# Patient Record
Sex: FEMALE | Race: WHITE | NOT HISPANIC OR LATINO | Employment: UNEMPLOYED | URBAN - METROPOLITAN AREA
[De-identification: names, ages, dates, MRNs, and addresses within clinical notes are randomized per-mention and may not be internally consistent; named-entity substitution may affect disease eponyms.]

---

## 2018-01-10 ENCOUNTER — OFFICE VISIT (OUTPATIENT)
Dept: FAMILY MEDICINE | Facility: CLINIC | Age: 43
End: 2018-01-10
Payer: COMMERCIAL

## 2018-01-10 ENCOUNTER — RADIANT APPOINTMENT (OUTPATIENT)
Dept: GENERAL RADIOLOGY | Facility: CLINIC | Age: 43
End: 2018-01-10
Attending: FAMILY MEDICINE
Payer: COMMERCIAL

## 2018-01-10 VITALS
WEIGHT: 153 LBS | SYSTOLIC BLOOD PRESSURE: 136 MMHG | OXYGEN SATURATION: 100 % | TEMPERATURE: 98.4 F | HEIGHT: 63 IN | DIASTOLIC BLOOD PRESSURE: 82 MMHG | BODY MASS INDEX: 27.11 KG/M2 | HEART RATE: 89 BPM | RESPIRATION RATE: 14 BRPM

## 2018-01-10 DIAGNOSIS — M75.41 IMPINGEMENT SYNDROME OF SHOULDER REGION, RIGHT: ICD-10-CM

## 2018-01-10 DIAGNOSIS — G56.01 CARPAL TUNNEL SYNDROME OF RIGHT WRIST: ICD-10-CM

## 2018-01-10 DIAGNOSIS — M75.41 IMPINGEMENT SYNDROME OF SHOULDER REGION, RIGHT: Primary | ICD-10-CM

## 2018-01-10 PROCEDURE — 73030 X-RAY EXAM OF SHOULDER: CPT | Mod: RT

## 2018-01-10 PROCEDURE — 99213 OFFICE O/P EST LOW 20 MIN: CPT | Performed by: FAMILY MEDICINE

## 2018-01-10 NOTE — NURSING NOTE
"Chief Complaint   Patient presents with     Shoulder Pain     Right shoulder and arm pain X 5 months        Initial /82 (BP Location: Right arm, Patient Position: Chair, Cuff Size: Adult Regular)  Pulse 89  Temp 98.4  F (36.9  C) (Oral)  Resp 14  Ht 5' 3\" (1.6 m)  Wt 153 lb (69.4 kg)  SpO2 100%  BMI 27.1 kg/m2 Estimated body mass index is 27.1 kg/(m^2) as calculated from the following:    Height as of this encounter: 5' 3\" (1.6 m).    Weight as of this encounter: 153 lb (69.4 kg).  Medication Reconciliation: complete   Price Barreto MA      "

## 2018-01-10 NOTE — MR AVS SNAPSHOT
After Visit Summary   1/10/2018    Akila Rivers    MRN: [id number]           Patient Information     Date Of Birth          1975        Visit Information        Provider Department      1/10/2018 3:00 PM Genesis Park MD [facility]        Today's Diagnoses     Impingement syndrome of shoulder region, right    -  1    Carpal tunnel syndrome of right wrist           Follow-ups after your visit        Additional Services     NEUROLOGY ADULT REFERRAL       Your provider has referred you for the following:   EMG at [facility]: [facility] [phone]   [email or web address]    Please be aware that coverage of these services is subject to the terms and limitations of your health insurance plan.  Call member services at your health plan with any benefit or coverage questions.      Please bring the following with you to your appointment:    (1) Any X-Rays, CTs or MRIs which have been performed.  Contact the facility where they were done to arrange for  prior to your scheduled appointment.    (2) List of current medications  (3) This referral request   (4) Any documents/labs given to you for this referral                  Who to contact     If you have questions or need follow up information about today's clinic visit or your schedule please contact [facility] directly at [phone].  Normal or non-critical lab and imaging results will be communicated to you by MyChart, letter or phone within 4 business days after the clinic has received the results. If you do not hear from us within 7 days, please contact the clinic through MyChart or phone. If you have a critical or abnormal lab result, we will notify you by phone as soon as possible.  Submit refill requests through [employer] or call your pharmacy and they will forward the refill request to us. Please allow 3 business days for  "your refill to be completed.          Additional Information About Your Visit        Disqushart Information     [employer] lets you send messages to your doctor, view your test results, renew your prescriptions, schedule appointments and more. To sign up, go to [email or web address] . Click on \"Log in\" on the left side of the screen, which will take you to the Welcome page. Then click on \"Sign up Now\" on the right side of the page.     You will be asked to enter the access code listed below, as well as some personal information. Please follow the directions to create your username and password.     Your access code is: [email or web address]  Expires: 4/10/2018  3:28 PM     Your access code will  in 90 days. If you need help or a new code, please call your [facility] clinic or [phone].        Care EveryWhere ID     This is your Care EveryWhere ID. This could be used by other organizations to access your [facility] medical records  [id number]        Your Vitals Were     Pulse Temperature Respirations Height Pulse Oximetry BMI (Body Mass Index)    89 98.4  F (36.9  C) (Oral) 14 5' 3\" (1.6 m) 100% 27.1 kg/m2       Blood Pressure from Last 3 Encounters:   01/10/18 136/82   16 136/80   16 118/72    Weight from Last 3 Encounters:   01/10/18 153 lb (69.4 kg)   16 149 lb (67.6 kg)   16 149 lb 14.4 oz (68 kg)              We Performed the Following     NEUROLOGY ADULT REFERRAL        Primary Care Provider Office Phone # Fax #    Genesis Park [phone] [address] [facility] [address]        Equal Access to Services     [facility] KENRICK : Kylee Jiang, karlee valentin, landy butleralmaanatoly stapleton, rashid cruz. So [facility] [phone].    ATENCIÓN: Si habla español, tiene a pisano disposición servicios gratuitos de asistencia lingüística. Llame al [phone].    We comply with applicable federal civil rights laws and [place] laws. We do " not discriminate on the basis of race, color, national origin, age, disability, sex, sexual orientation, or gender identity.            Thank you!     Thank you for choosing [facility]  for your care. Our goal is always to provide you with excellent care. Hearing back from our patients is one way we can continue to improve our services. Please take a few minutes to complete the written survey that you may receive in the mail after your visit with us. Thank you!             Your Updated Medication List - Protect others around you: Learn how to safely use, store and throw away your medicines at www.disposemymeds.org.          This list is accurate as of: 1/10/18  3:28 PM.  Always use your most recent med list.                   Brand Name Dispense Instructions for use Diagnosis    BENADRYL PO           cetirizine 10 MG tablet    zyrTEC    30 tablet    Take 1 tablet (10 mg) by mouth every evening    Seasonal allergic rhinitis       clindamycin 150 MG capsule    CLEOCIN    40 capsule    Take 1 capsule (150 mg) by mouth 4 times daily    Finger infection       hydrocortisone 0.2 % cream    WESTCORT    60 g    Apply sparingly to affected area three times daily as needed.    Other eczema       ibuprofen 200 MG tablet    ADVIL/MOTRIN     Take 200 mg by mouth every 8 hours as needed for mild pain        order for DME     1 each    Equipment being ordered: shampoo and conditioner  FREE AND CLEAR SHAMPOO AND CONDITIONER DUE TO ALLERGIC REACTION    Allergic reaction, initial encounter       ranitidine 150 MG tablet    ZANTAC    60 tablet    Take 1 tablet (150 mg) by mouth 2 times daily as needed    Gastroesophageal reflux disease, esophagitis presence not specified       TUMS 500 OR           valACYclovir 1000 mg tablet    VALTREX    4 tablet    Take 2 tablets (2,000 mg) by mouth 2 times daily    Recurrent cold sores

## 2018-01-10 NOTE — PROGRESS NOTES
SUBJECTIVE:   Akila Rivers is a 42 year old female who presents to clinic today for the following health issues:    She describes right shoulder and arm pain that began last 8/2017 after vacation.  She played a lot of volleyball.   She says pain starts at shoulder and travels down her hand.   She wakes in am and her hand is stiff and numb.  It is not numb all day.   She has weakness of her right hand and she does not trust her strength.  She will use her left arm and hand to help her right.         Joint Pain    Onset: X 5 months    Description:   Location: right shoulder  Character: Sharp    Intensity: mild during the day, severe at night    Progression of Symptoms: worse    Accompanying Signs & Symptoms:  Other symptoms: radiation of pain to Right arm    History:   Previous similar pain: no       Precipitating factors:   Trauma or overuse: no     Alleviating factors:  Improved by: heat, massage and Ibuprofen    Therapies Tried and outcome: message, oil, IBU helps        Past Medical History:   Diagnosis Date     Blood type O+      Headache(784.0)     when working with chemicals       History reviewed. No pertinent surgical history.    MEDICATIONS:  Current Outpatient Prescriptions   Medication     ibuprofen (ADVIL,MOTRIN) 200 MG tablet     valACYclovir (VALTREX) 1000 mg tablet     ranitidine (ZANTAC) 150 MG tablet     hydrocortisone (WESTCORT) 0.2 % cream     clindamycin (CLEOCIN) 150 MG capsule     Calcium Carbonate (TUMS 500 OR)     DiphenhydrAMINE HCl (BENADRYL PO)     cetirizine (ZYRTEC) 10 MG tablet     order for DME     No current facility-administered medications for this visit.        SOCIAL HISTORY:  Social History   Substance Use Topics     Smoking status: Never Smoker     Smokeless tobacco: Never Used     Alcohol use No       Family History   Problem Relation Age of Onset     CANCER Mother      cervical     Thyroid Disease Mother      took medication for this     Family History Negative Father       "OSTEOPOROSIS Paternal Uncle      60       Objective:  Blood pressure 136/82, pulse 89, temperature 98.4  F (36.9  C), temperature source Oral, resp. rate 14, height 5' 3\" (1.6 m), weight 153 lb (69.4 kg), SpO2 100 %, not currently breastfeeding.  Chest: Clear to auscultation bilaterally.  No wheezes, rales or retractions.  CV: Regular rate and rhythm without murmurs, rubs or gallops.  Right Shoulder exam:  There is no swelling, redness or gross deformity of the shoulder joints.  There is full range of motion with some pain in the midarch.  The empty can sign is NEG.  The impingement sign is NEG.  The rotater cuff strength is 5/5 with no pain with internal rotation and none with external rotation.  She has some pain in the insertion of the biceps tendon  Right hand: tinnels sign is NEG but her right thenar eminence is smaller than the left      Xray of the right shoulder: normal      Assessment:  1. ? Tendonitis of shoulder vs impingement   2. Suspect carpal tunnel syndrome      Plan:  1. Will get EMG  2. If this is normal, will send to PT  3. The patient is to follow up as needed for nonresolution of symptoms              "

## 2018-01-19 ENCOUNTER — TRANSFERRED RECORDS (OUTPATIENT)
Dept: HEALTH INFORMATION MANAGEMENT | Facility: CLINIC | Age: 43
End: 2018-01-19

## 2019-11-07 ENCOUNTER — OFFICE VISIT (OUTPATIENT)
Dept: FAMILY MEDICINE | Facility: CLINIC | Age: 44
End: 2019-11-07

## 2019-11-07 VITALS
OXYGEN SATURATION: 94 % | DIASTOLIC BLOOD PRESSURE: 64 MMHG | HEART RATE: 111 BPM | TEMPERATURE: 100.2 F | WEIGHT: 151 LBS | SYSTOLIC BLOOD PRESSURE: 110 MMHG | BODY MASS INDEX: 26.75 KG/M2 | HEIGHT: 63 IN

## 2019-11-07 DIAGNOSIS — J98.01 ACUTE BRONCHOSPASM: ICD-10-CM

## 2019-11-07 DIAGNOSIS — J06.9 VIRAL UPPER RESPIRATORY INFECTION: ICD-10-CM

## 2019-11-07 DIAGNOSIS — R50.9 FEVER, UNSPECIFIED FEVER CAUSE: Primary | ICD-10-CM

## 2019-11-07 LAB
FLUAV+FLUBV AG SPEC QL: NEGATIVE
FLUAV+FLUBV AG SPEC QL: NEGATIVE
SPECIMEN SOURCE: NORMAL

## 2019-11-07 PROCEDURE — 87804 INFLUENZA ASSAY W/OPTIC: CPT | Performed by: NURSE PRACTITIONER

## 2019-11-07 PROCEDURE — 99213 OFFICE O/P EST LOW 20 MIN: CPT | Performed by: NURSE PRACTITIONER

## 2019-11-07 RX ORDER — BENZONATATE 200 MG/1
200 CAPSULE ORAL 3 TIMES DAILY PRN
Qty: 30 CAPSULE | Refills: 0 | Status: SHIPPED | OUTPATIENT
Start: 2019-11-07 | End: 2021-08-23

## 2019-11-07 RX ORDER — ALBUTEROL SULFATE 90 UG/1
2 AEROSOL, METERED RESPIRATORY (INHALATION) EVERY 4 HOURS PRN
Qty: 1 INHALER | Refills: 0 | Status: SHIPPED | OUTPATIENT
Start: 2019-11-07 | End: 2024-04-17

## 2019-11-07 ASSESSMENT — MIFFLIN-ST. JEOR: SCORE: 1309.06

## 2019-11-07 NOTE — PROGRESS NOTES
Subjective     Akila Rivers is a 44 year old female who presents to clinic today for the following health issues:    HPI   Acute Illness   Acute illness concerns: fever  Onset: 5 days      Fever: YES- 101, has improved over the past 1 day.      Chills/Sweats: YES    Headache (location?): YES    Sinus Pressure:no    Conjunctivitis:  no    Ear Pain: YES    Rhinorrhea: no    Congestion: YES    Sore Throat: no     Cough: YES - Difficulty with sleeping at night.      No shortness of breath.      Wheeze: YES    Decreased Appetite: YES    Nausea: YES    Vomiting: no    Diarrhea:  no    Dysuria/Freq.: no    Fatigue/Achiness: YES    Sick/Strep Exposure: YES- family     Therapies Tried and outcome: ibuprofen, herbal tea, cough drops.        Patient Active Problem List   Diagnosis     Esophageal reflux     Anemia     CARDIOVASCULAR SCREENING; LDL GOAL LESS THAN 160     Plantar fasciitis     IBS (irritable bowel syndrome)     Seasonal allergic rhinitis     Back pain     Family history of osteoporosis     No past surgical history on file.    Social History     Tobacco Use     Smoking status: Never Smoker     Smokeless tobacco: Never Used   Substance Use Topics     Alcohol use: No     Family History   Problem Relation Age of Onset     Cancer Mother         cervical     Thyroid Disease Mother         took medication for this     Family History Negative Father      Osteoporosis Paternal Uncle         60         Current Outpatient Medications   Medication Sig Dispense Refill     albuterol (PROAIR HFA/PROVENTIL HFA/VENTOLIN HFA) 108 (90 Base) MCG/ACT inhaler Inhale 2 puffs into the lungs every 4 hours as needed for shortness of breath / dyspnea or wheezing 1 Inhaler 0     benzonatate (TESSALON) 200 MG capsule Take 1 capsule (200 mg) by mouth 3 times daily as needed for cough 30 capsule 0     Calcium Carbonate (TUMS 500 OR)        cetirizine (ZYRTEC) 10 MG tablet Take 1 tablet (10 mg) by mouth every evening (Patient not taking:  "Reported on 1/10/2018) 30 tablet 6     DiphenhydrAMINE HCl (BENADRYL PO)        ibuprofen (ADVIL,MOTRIN) 200 MG tablet Take 200 mg by mouth every 8 hours as needed for mild pain       ranitidine (ZANTAC) 150 MG tablet Take 1 tablet (150 mg) by mouth 2 times daily as needed (Patient not taking: Reported on 1/10/2018) 60 tablet 6     Allergies   Allergen Reactions     Robitussin Night Time      Robitussin AC - had lip swelling and tingling     Banana Swelling     Cefzil [Cefprozil]      hives--itching--rash     Flexeril [Cyclobenzaprine Hcl] Swelling and Rash     Iron Rash     rash and itchy     Penicillins Itching and Swelling     Prenatal Vitamin GI Disturbance       Reviewed and updated as needed this visit by Provider         Review of Systems   ROS COMP: Constitutional, HEENT, cardiovascular, pulmonary, gi and gu systems are negative, except as otherwise noted.      Objective    /64   Pulse 111   Temp 100.2  F (37.9  C) (Tympanic)   Ht 1.6 m (5' 3\")   Wt 68.5 kg (151 lb)   SpO2 94%   BMI 26.75 kg/m    Body mass index is 26.75 kg/m .  Physical Exam   GENERAL: healthy, alert and no distress  EYES: Eyes grossly normal to inspection, PERRL and conjunctivae and sclerae normal  HENT: ear canals and TM's normal, nose and mouth without ulcers or lesions  NECK: no adenopathy, no asymmetry, masses  RESP: audible harsh cough, posterior lung sounds with good air flow and mild expiratory wheezes throughout all lung fields.    CV: regular rate and rhythm, normal S1 S2, no S3 or S4, no murmur, click or rub, no peripheral edema   PSYCH: mentation appears normal, affect normal/bright            Assessment & Plan     Akila was seen today for fever.    Diagnoses and all orders for this visit:          Fever, unspecified fever cause  -     Influenza A/B antigen  Results for orders placed or performed in visit on 11/07/19   Influenza A/B antigen     Status: None   Result Value Ref Range    Influenza A/B Agn Specimen Nasal  "    Influenza A Negative NEG^Negative    Influenza B Negative NEG^Negative       Viral upper respiratory infection        Acute bronchospasm  Patient education completed regarding viral cause, typical course, symptomatic treatment and when to follow-up.   Recommended use of Albuterol inhaler every 4-6 hours scheduled until cough improves.    -     albuterol (PROAIR HFA/PROVENTIL HFA/VENTOLIN HFA) 108 (90 Base) MCG/ACT inhaler; Inhale 2 puffs into the lungs every 4 hours as needed for shortness of breath / dyspnea or wheezing  -     benzonatate (TESSALON) 200 MG capsule; Take 1 capsule (200 mg) by mouth 3 times daily as needed for cough      Return in about 1 week (around 11/14/2019) for No improvement or sooner with worsening symptoms.      ROBERT Brannon [facility]

## 2019-11-07 NOTE — LETTER
November 14, 2019      Akila Rivers  [address] [place] [place] [address]        To Whom It May Concern:    Akila Rivers was seen in our clinic on 11/7/19 for an acute illness. She may return to work once her symptoms have improved.        Sincerely,        ROBERT Brannon CNP

## 2019-11-07 NOTE — PATIENT INSTRUCTIONS
Akila was seen today for fever.    Diagnoses and all orders for this visit:    Fever, unspecified fever cause  -     Influenza A/B antigen  Results for orders placed or performed in visit on 11/07/19   Influenza A/B antigen     Status: None   Result Value Ref Range    Influenza A/B Agn Specimen Nasal     Influenza A Negative NEG^Negative    Influenza B Negative NEG^Negative       Acute bronchospasm  -     albuterol (PROAIR HFA/PROVENTIL HFA/VENTOLIN HFA) 108 (90 Base) MCG/ACT inhaler; Inhale 2 puffs into the lungs every 4 hours as needed for shortness of breath / dyspnea or wheezing    Viral upper respiratory infection  -     benzonatate (TESSALON) 200 MG capsule; Take 1 capsule (200 mg) by mouth 3 times daily as needed for cough

## 2021-08-23 ENCOUNTER — OFFICE VISIT (OUTPATIENT)
Dept: FAMILY MEDICINE | Facility: CLINIC | Age: 46
End: 2021-08-23
Payer: COMMERCIAL

## 2021-08-23 VITALS
SYSTOLIC BLOOD PRESSURE: 134 MMHG | WEIGHT: 148.8 LBS | TEMPERATURE: 98.3 F | OXYGEN SATURATION: 100 % | HEART RATE: 75 BPM | HEIGHT: 64 IN | DIASTOLIC BLOOD PRESSURE: 84 MMHG | BODY MASS INDEX: 25.4 KG/M2 | RESPIRATION RATE: 16 BRPM

## 2021-08-23 DIAGNOSIS — Z13.6 CARDIOVASCULAR SCREENING; LDL GOAL LESS THAN 160: Primary | ICD-10-CM

## 2021-08-23 DIAGNOSIS — Z12.31 VISIT FOR SCREENING MAMMOGRAM: ICD-10-CM

## 2021-08-23 DIAGNOSIS — Z00.00 ROUTINE GENERAL MEDICAL EXAMINATION AT A HEALTH CARE FACILITY: ICD-10-CM

## 2021-08-23 LAB
ERYTHROCYTE [DISTWIDTH] IN BLOOD BY AUTOMATED COUNT: 13.2 % (ref 10–15)
HCT VFR BLD AUTO: 40.1 % (ref 35–47)
HCV AB SERPL QL IA: NONREACTIVE
HGB BLD-MCNC: 13.1 G/DL (ref 11.7–15.7)
MCH RBC QN AUTO: 30 PG (ref 26.5–33)
MCHC RBC AUTO-ENTMCNC: 32.7 G/DL (ref 31.5–36.5)
MCV RBC AUTO: 92 FL (ref 78–100)
PLATELET # BLD AUTO: 289 10E3/UL (ref 150–450)
RBC # BLD AUTO: 4.36 10E6/UL (ref 3.8–5.2)
WBC # BLD AUTO: 4.7 10E3/UL (ref 4–11)

## 2021-08-23 PROCEDURE — 84443 ASSAY THYROID STIM HORMONE: CPT | Performed by: FAMILY MEDICINE

## 2021-08-23 PROCEDURE — 99396 PREV VISIT EST AGE 40-64: CPT | Performed by: FAMILY MEDICINE

## 2021-08-23 PROCEDURE — 85027 COMPLETE CBC AUTOMATED: CPT | Performed by: FAMILY MEDICINE

## 2021-08-23 PROCEDURE — 86803 HEPATITIS C AB TEST: CPT | Performed by: FAMILY MEDICINE

## 2021-08-23 PROCEDURE — 80061 LIPID PANEL: CPT | Performed by: FAMILY MEDICINE

## 2021-08-23 PROCEDURE — 36415 COLL VENOUS BLD VENIPUNCTURE: CPT | Performed by: FAMILY MEDICINE

## 2021-08-23 PROCEDURE — 80053 COMPREHEN METABOLIC PANEL: CPT | Performed by: FAMILY MEDICINE

## 2021-08-23 ASSESSMENT — ENCOUNTER SYMPTOMS
DYSURIA: 0
FREQUENCY: 0
DIZZINESS: 0
HEMATOCHEZIA: 0
NAUSEA: 0
HEMATURIA: 0
COUGH: 0
SHORTNESS OF BREATH: 0
DIARRHEA: 0
CONSTIPATION: 0
FEVER: 0
EYE PAIN: 0
PALPITATIONS: 0
JOINT SWELLING: 0
ARTHRALGIAS: 0
HEARTBURN: 0
SORE THROAT: 0
WEAKNESS: 0
MYALGIAS: 0
ABDOMINAL PAIN: 0
NERVOUS/ANXIOUS: 0
HEADACHES: 1
BREAST MASS: 0
PARESTHESIAS: 0
CHILLS: 0

## 2021-08-23 ASSESSMENT — MIFFLIN-ST. JEOR: SCORE: 1297.01

## 2021-08-23 NOTE — PATIENT INSTRUCTIONS
Preventive Health Recommendations  Female Ages 40 to 49    Yearly exam:     See your health care provider every year in order to  1. Review health changes.   2. Discuss preventive care.    3. Review your medicines if your doctor prescribed any.      Get a Pap test every three years (unless you have an abnormal result and your provider advises testing more often).      If you get Pap tests with HPV test, you only need to test every 5 years, unless you have an abnormal result. You do not need a Pap test if your uterus was removed (hysterectomy) and you have not had cancer.      You should be tested each year for STDs (sexually transmitted diseases), if you're at risk.     Ask your doctor if you should have a mammogram.      Have a colonoscopy (test for colon cancer) if someone in your family has had colon cancer or polyps before age 50.       Have a cholesterol test every 5 years.       Have a diabetes test (fasting glucose) after age 45. If you are at risk for diabetes, you should have this test every 3 years.    Shots: Get a flu shot each year. Get a tetanus shot every 10 years.     Nutrition:     Eat at least 5 servings of fruits and vegetables each day.    Eat whole-grain bread, whole-wheat pasta and brown rice instead of white grains and rice.    Get adequate Calcium and Vitamin D.      Lifestyle    Exercise at least 150 minutes a week (an average of 30 minutes a day, 5 days a week). This will help you control your weight and prevent disease.    Limit alcohol to one drink per day.    No smoking.     Wear sunscreen to prevent skin cancer.    See your dentist every six months for an exam and cleaning.      You may schedule your mammogram at [facility] by calling [phone] and asking to schedule your mammogram or you may schedule with [facility] in [place] by calling [phone].

## 2021-08-23 NOTE — PROGRESS NOTES
SUBJECTIVE:   CC: Akila Rivers is an 46 year old woman who presents for preventive health visit.     She has no concerns today.   She wants to make sure the ultrasound she had back in 2013 was negative for cancer under her chin.  She does not take any regular medications.   She is getting her period right now and will come back to have her pap.         Patient has been advised of split billing requirements and indicates understanding: Yes  Healthy Habits:     Getting at least 3 servings of Calcium per day:  Yes    Bi-annual eye exam:  Yes    Dental care twice a year:  Yes    Sleep apnea or symptoms of sleep apnea:  None    Diet:  Regular (no restrictions)    Frequency of exercise:  1 day/week    Duration of exercise:  15-30 minutes    Taking medications regularly:  Yes    Medication side effects:  None    PHQ-2 Total Score: 1    Additional concerns today:  No        Today's PHQ-2 Score:   PHQ-2 ( 1999 [employer]) 8/23/2021   Q1: Little interest or pleasure in doing things 0   Q2: Feeling down, depressed or hopeless 1   PHQ-2 Score 1   Q1: Little interest or pleasure in doing things Not at all   Q2: Feeling down, depressed or hopeless Several days   PHQ-2 Score 1       Abuse: Current or Past (Physical, Sexual or Emotional) - No  Do you feel safe in your environment? Yes    Have you ever done Advance Care Planning? (For example, a Health Directive, POLST, or a discussion with a medical provider or your loved ones about your wishes): did not discuss today     Social History     Tobacco Use     Smoking status: Never Smoker     Smokeless tobacco: Never Used   Substance Use Topics     Alcohol use: No     If you drink alcohol do you typically have >3 drinks per day or >7 drinks per week? No    Alcohol Use 8/23/2021   Prescreen: >3 drinks/day or >7 drinks/week? No   Prescreen: >3 drinks/day or >7 drinks/week? -       Reviewed orders with patient.  Reviewed health maintenance and updated orders accordingly - Yes  Labs  reviewed in EPIC    Breast Cancer Screening:  Any new diagnosis of family breast, ovarian, or bowel cancer? Yes - mother with cervical cancer      FHS-7:   Breast CA Risk Assessment (FHS-7) 8/23/2021   Did any of your first-degree relatives have breast or ovarian cancer? Yes   Did any of your relatives have bilateral breast cancer? No   Did any man in your family have breast cancer? No   Did any woman in your family have breast and ovarian cancer? No   Did any woman in your family have breast cancer before age 50 y? Yes   Do you have 2 or more relatives with breast and/or bowel cancer? No       Mammogram Screening: Recommended annual mammography  Pertinent mammograms are reviewed under the imaging tab.    History of abnormal Pap smear: NO - age 30-65 PAP every 5 years with negative HPV co-testing recommended  PAP / HPV 8/24/2016 2/20/2013 1/25/2012   PAP (Historical) NIL NIL NIL     Reviewed and updated as needed this visit by clinical staff                 Reviewed and updated as needed this visit by Provider                Past Medical History:   Diagnosis Date     Blood type O+      Headache(784.0)     when working with chemicals       History reviewed. No pertinent surgical history.    MEDICATIONS:  Current Outpatient Medications   Medication     albuterol (PROAIR HFA/PROVENTIL HFA/VENTOLIN HFA) 108 (90 Base) MCG/ACT inhaler     Calcium Carbonate (TUMS 500 OR)     DiphenhydrAMINE HCl (BENADRYL PO)     ibuprofen (ADVIL,MOTRIN) 200 MG tablet     No current facility-administered medications for this visit.       SOCIAL HISTORY:  Social History     Tobacco Use     Smoking status: Never Smoker     Smokeless tobacco: Never Used   Substance Use Topics     Alcohol use: No       Family History   Problem Relation Age of Onset     Cancer Mother         cervical     Thyroid Disease Mother         took medication for this     Family History Negative Father      Osteoporosis Paternal Uncle         60         Review of Systems  "  Constitutional: Negative for chills and fever.   HENT: Negative for congestion, ear pain, hearing loss and sore throat.    Eyes: Positive for visual disturbance. Negative for pain.   Respiratory: Negative for cough and shortness of breath.    Cardiovascular: Negative for chest pain, palpitations and peripheral edema.   Gastrointestinal: Negative for abdominal pain, constipation, diarrhea, heartburn, hematochezia and nausea.   Breasts:  Negative for tenderness, breast mass and discharge.   Genitourinary: Negative for dysuria, frequency, genital sores, hematuria, pelvic pain, urgency, vaginal bleeding and vaginal discharge.   Musculoskeletal: Negative for arthralgias, joint swelling and myalgias.   Skin: Negative for rash.   Neurological: Positive for headaches. Negative for dizziness, weakness and paresthesias.   Psychiatric/Behavioral: Negative for mood changes. The patient is not nervous/anxious.           OBJECTIVE:   /84 (BP Location: Right arm, Patient Position: Chair, Cuff Size: Adult Regular)   Pulse 75   Temp 98.3  F (36.8  C) (Oral)   Resp 16   Ht 1.613 m (5' 3.5\")   Wt 67.5 kg (148 lb 12.8 oz)   LMP 08/23/2021   SpO2 100%   BMI 25.95 kg/m    Physical Exam  GENERAL: healthy, alert and no distress  EYES: Eyes grossly normal to inspection, PERRL and conjunctivae and sclerae normal  HENT: ear canals and TM's normal, nose and mouth without ulcers or lesions  NECK: no adenopathy, no asymmetry, masses, or scars and thyroid normal to palpation  RESP: lungs clear to auscultation - no rales, rhonchi or wheezes  BREAST: normal without masses, tenderness or nipple discharge and no palpable axillary masses or adenopathy  CV: regular rate and rhythm, normal S1 S2, no S3 or S4, no murmur, click or rub, no peripheral edema and peripheral pulses strong  ABDOMEN: soft, nontender, no hepatosplenomegaly, no masses and bowel sounds normal  MS: no gross musculoskeletal defects noted, no edema  SKIN: no suspicious " "lesions or rashes  NEURO: Normal strength and tone, mentation intact and speech normal  PSYCH: mentation appears normal, affect normal/bright  LYMPH: no cervical, supraclavicular, axillary, or inguinal adenopathy    Diagnostic Test Results:  Labs reviewed in Epic    ASSESSMENT/PLAN:   (Z13.6) CARDIOVASCULAR SCREENING; LDL GOAL LESS THAN 160  (primary encounter diagnosis)  Comment:   Plan: Lipid panel reflex to direct LDL Fasting            (Z00.00) Routine general medical examination at a health care facility  Comment:   Plan: Hepatitis C antibody, Lipid panel reflex to         direct LDL Fasting, CBC with platelets,         Comprehensive metabolic panel (BMP + Alb, Alk         Phos, ALT, AST, Total. Bili, TP), TSH with free        T4 reflex    She will return for pap in 1-2 weeks            (Z12.31) Visit for screening mammogram  Comment:   Plan: Mammo Screening digital (bilat)              Patient has been advised of split billing requirements and indicates understanding: Yes  COUNSELING:  Reviewed preventive health counseling, as reflected in patient instructions  Special attention given to:        Regular exercise       Healthy diet/nutrition       Immunizations    Discussed covid vaccine and recommended this          Estimated body mass index is 26.75 kg/m  as calculated from the following:    Height as of 11/7/19: 1.6 m (5' 3\").    Weight as of 11/7/19: 68.5 kg (151 lb).        She reports that she has never smoked. She has never used smokeless tobacco.      Counseling Resources:  ATP IV Guidelines  Pooled Cohorts Equation Calculator  Breast Cancer Risk Calculator  BRCA-Related Cancer Risk Assessment: FHS-7 Tool  FRAX Risk Assessment  ICSI Preventive Guidelines  Dietary Guidelines for Americans, 2010  USDA's MyPlate  ASA Prophylaxis  Lung CA Screening    Genesis Oneill MD  [facility]"

## 2021-08-24 LAB
ALBUMIN SERPL-MCNC: 4 G/DL (ref 3.4–5)
ALP SERPL-CCNC: 74 U/L (ref 40–150)
ALT SERPL W P-5'-P-CCNC: 18 U/L (ref 0–50)
ANION GAP SERPL CALCULATED.3IONS-SCNC: 6 MMOL/L (ref 3–14)
AST SERPL W P-5'-P-CCNC: 17 U/L (ref 0–45)
BILIRUB SERPL-MCNC: 0.4 MG/DL (ref 0.2–1.3)
BUN SERPL-MCNC: 13 MG/DL (ref 7–30)
CALCIUM SERPL-MCNC: 9 MG/DL (ref 8.5–10.1)
CHLORIDE BLD-SCNC: 110 MMOL/L (ref 94–109)
CHOLEST SERPL-MCNC: 179 MG/DL
CO2 SERPL-SCNC: 24 MMOL/L (ref 20–32)
CREAT SERPL-MCNC: 0.74 MG/DL (ref 0.52–1.04)
FASTING STATUS PATIENT QL REPORTED: YES
GFR SERPL CREATININE-BSD FRML MDRD: >90 ML/MIN/1.73M2
GLUCOSE BLD-MCNC: 89 MG/DL (ref 70–99)
HDLC SERPL-MCNC: 49 MG/DL
LDLC SERPL CALC-MCNC: 107 MG/DL
NONHDLC SERPL-MCNC: 130 MG/DL
POTASSIUM BLD-SCNC: 4.2 MMOL/L (ref 3.4–5.3)
PROT SERPL-MCNC: 7.3 G/DL (ref 6.8–8.8)
SODIUM SERPL-SCNC: 140 MMOL/L (ref 133–144)
TRIGL SERPL-MCNC: 114 MG/DL
TSH SERPL DL<=0.005 MIU/L-ACNC: 0.68 MU/L (ref 0.4–4)

## 2021-09-07 ENCOUNTER — ANCILLARY PROCEDURE (OUTPATIENT)
Dept: MAMMOGRAPHY | Facility: CLINIC | Age: 46
End: 2021-09-07
Attending: FAMILY MEDICINE
Payer: COMMERCIAL

## 2021-09-07 DIAGNOSIS — Z12.31 VISIT FOR SCREENING MAMMOGRAM: ICD-10-CM

## 2021-09-07 PROCEDURE — 77067 SCR MAMMO BI INCL CAD: CPT | Mod: TC | Performed by: RADIOLOGY

## 2021-09-15 ENCOUNTER — OFFICE VISIT (OUTPATIENT)
Dept: FAMILY MEDICINE | Facility: CLINIC | Age: 46
End: 2021-09-15
Payer: COMMERCIAL

## 2021-09-15 VITALS
HEART RATE: 70 BPM | SYSTOLIC BLOOD PRESSURE: 138 MMHG | RESPIRATION RATE: 18 BRPM | OXYGEN SATURATION: 99 % | DIASTOLIC BLOOD PRESSURE: 88 MMHG | TEMPERATURE: 98.2 F | BODY MASS INDEX: 25.81 KG/M2 | WEIGHT: 148 LBS

## 2021-09-15 DIAGNOSIS — Z23 NEED FOR PROPHYLACTIC VACCINATION AND INOCULATION AGAINST INFLUENZA: Primary | ICD-10-CM

## 2021-09-15 DIAGNOSIS — Z12.4 SCREENING FOR MALIGNANT NEOPLASM OF CERVIX: ICD-10-CM

## 2021-09-15 DIAGNOSIS — K21.9 GASTROESOPHAGEAL REFLUX DISEASE WITHOUT ESOPHAGITIS: ICD-10-CM

## 2021-09-15 PROCEDURE — 99213 OFFICE O/P EST LOW 20 MIN: CPT | Mod: 25 | Performed by: FAMILY MEDICINE

## 2021-09-15 PROCEDURE — 90686 IIV4 VACC NO PRSV 0.5 ML IM: CPT | Performed by: FAMILY MEDICINE

## 2021-09-15 PROCEDURE — 87624 HPV HI-RISK TYP POOLED RSLT: CPT | Performed by: FAMILY MEDICINE

## 2021-09-15 PROCEDURE — G0145 SCR C/V CYTO,THINLAYER,RESCR: HCPCS | Performed by: FAMILY MEDICINE

## 2021-09-15 PROCEDURE — 90471 IMMUNIZATION ADMIN: CPT | Performed by: FAMILY MEDICINE

## 2021-09-15 RX ORDER — FAMOTIDINE 20 MG/1
20 TABLET, FILM COATED ORAL 2 TIMES DAILY
Qty: 30 TABLET | Refills: 11 | Status: SHIPPED | OUTPATIENT
Start: 2021-09-15 | End: 2024-04-17

## 2021-09-15 NOTE — PROGRESS NOTES
Assessment & Plan     Need for prophylactic vaccination and inoculation against influenza  Flu shot given    Screening for malignant neoplasm of cervix    - REVIEW OF HEALTH MAINTENANCE PROTOCOL ORDERS  - Pap Screen with HPV - recommended age 30 - 65 years  - INFLUENZA VACCINE IM > 6 MONTHS VALENT IIV4 (AFLURIA/FLUZONE)    Gastroesophageal reflux disease without esophagitis  Continue on current rx  - famotidine (PEPCID) 20 MG tablet; Take 1 tablet (20 mg) by mouth 2 times daily      15 minutes spent on the date of the encounter doing chart review, review of test results, patient visit and documentation            Return in about 1 year (around 9/15/2022) for Physical Exam.    Genesis Oneill MD  [facility] MARY BETH Wilburn is a 46 year old who presents for the following health issues - she had physical but she was having her period last time.     HPI     Pt is here today for pap smear.     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 No pertinent surgical history.    MEDICATIONS:  Current Outpatient Medications   Medication     famotidine (PEPCID) 20 MG tablet     albuterol (PROAIR HFA/PROVENTIL HFA/VENTOLIN HFA) 108 (90 Base) MCG/ACT inhaler     Calcium Carbonate (TUMS 500 OR)     DiphenhydrAMINE HCl (BENADRYL PO)     ibuprofen (ADVIL,MOTRIN) 200 MG tablet     No current facility-administered medications for this visit.       SOCIAL HISTORY:  Social History     Tobacco Use     Smoking status: Never Smoker     Smokeless tobacco: Never Used   Substance Use Topics     Alcohol use: No       Family History   Problem Relation Age of Onset     Cancer Mother         cervical     Thyroid Disease Mother         took medication for this     Family History Negative Father      Osteoporosis Paternal Uncle         60         Review of Systems   Constitutional, HEENT, cardiovascular, pulmonary, gi and gu  systems are negative, except as otherwise noted.      Objective    /88 (BP Location: Right arm, Patient Position: Sitting, Cuff Size: Adult Large)   Pulse 70   Temp 98.2  F (36.8  C) (Oral)   Resp 18   Wt 67.1 kg (148 lb)   LMP 08/23/2021   SpO2 99%   BMI 25.81 kg/m    Body mass index is 25.81 kg/m .  Physical Exam   GENERAL: healthy, alert and no distress  EYES: Eyes grossly normal to inspection, PERRL and conjunctivae and sclerae normal  HENT: ear canals and TM's normal, nose and mouth without ulcers or lesions  NECK: no adenopathy, no asymmetry, masses, or scars and thyroid normal to palpation  RESP: lungs clear to auscultation - no rales, rhonchi or wheezes  BREAST: normal without masses, tenderness or nipple discharge and no palpable axillary masses or adenopathy  CV: regular rate and rhythm, normal S1 S2, no S3 or S4, no murmur, click or rub, no peripheral edema and peripheral pulses strong  ABDOMEN: soft, nontender, no hepatosplenomegaly, no masses and bowel sounds normal   (female): normal female external genitalia, normal urethral meatus, vaginal mucosa pink, moist, well rugated, and normal cervix/adnexa/uterus without masses or discharge  MS: no gross musculoskeletal defects noted, no edema  SKIN: no suspicious lesions or rashes  NEURO: Normal strength and tone, mentation intact and speech normal  PSYCH: mentation appears normal, affect normal/bright  LYMPH: no cervical, supraclavicular, axillary, or inguinal adenopathy    Office Visit on 08/23/2021   Component Date Value Ref Range Status     Hepatitis C Antibody 08/23/2021 Nonreactive  Nonreactive Final     Cholesterol 08/23/2021 179  <200 mg/dL Final    Age 0-19 years  Desirable: <170 mg/dL  Borderline high:  170-199 mg/dl  High:            >199 mg/dl    Age 20 years and older  Desirable: <200 mg/dL     Triglycerides 08/23/2021 114  <150 mg/dL Final    0-9 years:  Normal:    Less than 75 mg/dL  Borderline high:  75-99 mg/dL  High:              Greater than or equal to 100 mg/dL    0-19 years:  Normal:    Less than 90 mg/dL  Borderline high:   mg/dL  High:             Greater than or equal to 130 mg/dL    20 years and older:  Normal:    Less than 150 mg/dL  Borderline high:  150-199 mg/dL  High:             200-499 mg/dL  Very high:   Greater than or equal to 500 mg/dL     Direct Measure HDL 08/23/2021 49* >=50 mg/dL Final    0-19 years:       Greater than or equal to 45 mg/dL   Low: Less than 40 mg/dL   Borderline low: 40-44 mg/dL     20 years and older:   Female: Greater than or equal to 50 mg/dL   Male:   Greater than or equal to 40 mg/dL          LDL Cholesterol Calculated 08/23/2021 107* <=100 mg/dL Final    Age 0-19 years:  Desirable: 0-110 mg/dL   Borderline high: 110-129 mg/dL   High: >= 130 mg/dL    Age 20 years and older:  Desirable: <100mg/dL  Above desirable: 100-129 mg/dL   Borderline high: 130-159 mg/dL   High: 160-189 mg/dL   Very high: >= 190 mg/dL     Non HDL Cholesterol 08/23/2021 130* <130 mg/dL Final    0-19 years:  Desirable:          Less than 120 mg/dL  Borderline high:   120-144 mg/dL  High:                   Greater than or equal to 145 mg/dL    20 years and older:  Desirable:          130 mg/dL  Above Desirable: 130-159 mg/dL  Borderline high:   160-189 mg/dL  High:               190-219 mg/dL  Very high:     Greater than or equal to 220 mg/dL     Patient Fasting > 8hrs? 08/23/2021 Yes   Final     WBC Count 08/23/2021 4.7  4.0 - 11.0 10e3/uL Final     RBC Count 08/23/2021 4.36  3.80 - 5.20 10e6/uL Final     Hemoglobin 08/23/2021 13.1  11.7 - 15.7 g/dL Final     Hematocrit 08/23/2021 40.1  35.0 - 47.0 % Final     MCV 08/23/2021 92  78 - 100 fL Final     MCH 08/23/2021 30.0  26.5 - 33.0 pg Final     MCHC 08/23/2021 32.7  31.5 - 36.5 g/dL Final     RDW 08/23/2021 13.2  10.0 - 15.0 % Final     Platelet Count 08/23/2021 289  150 - 450 10e3/uL Final     Sodium 08/23/2021 140  133 - 144 mmol/L Final     Potassium 08/23/2021 4.2   3.4 - 5.3 mmol/L Final     Chloride 08/23/2021 110* 94 - 109 mmol/L Final     Carbon Dioxide (CO2) 08/23/2021 24  20 - 32 mmol/L Final     Anion Gap 08/23/2021 6  3 - 14 mmol/L Final     Urea Nitrogen 08/23/2021 13  7 - 30 mg/dL Final     Creatinine 08/23/2021 0.74  0.52 - 1.04 mg/dL Final     Calcium 08/23/2021 9.0  8.5 - 10.1 mg/dL Final     Glucose 08/23/2021 89  70 - 99 mg/dL Final     Alkaline Phosphatase 08/23/2021 74  40 - 150 U/L Final     AST 08/23/2021 17  0 - 45 U/L Final     ALT 08/23/2021 18  0 - 50 U/L Final     Protein Total 08/23/2021 7.3  6.8 - 8.8 g/dL Final     Albumin 08/23/2021 4.0  3.4 - 5.0 g/dL Final     Bilirubin Total 08/23/2021 0.4  0.2 - 1.3 mg/dL Final     GFR Estimate 08/23/2021 >90  >60 mL/min/1.73m2 Final    As of July 11, 2021, eGFR is calculated by the CKD-EPI creatinine equation, without race adjustment. eGFR can be influenced by muscle mass, exercise, and diet. The reported eGFR is an estimation only and is only applicable if the renal function is stable.     TSH 08/23/2021 0.68  0.40 - 4.00 mU/L Final

## 2021-09-18 LAB
BKR LAB AP GYN ADEQUACY: NORMAL
BKR LAB AP GYN INTERPRETATION: NORMAL
BKR LAB AP HPV REFLEX: NORMAL
BKR LAB AP LMP: NORMAL
BKR LAB AP PREVIOUS ABNORMAL: NORMAL
PATH REPORT.COMMENTS IMP SPEC: NORMAL
PATH REPORT.RELEVANT HX SPEC: NORMAL

## 2021-09-21 LAB
HUMAN PAPILLOMA VIRUS 16 DNA: NEGATIVE
HUMAN PAPILLOMA VIRUS 18 DNA: NEGATIVE
HUMAN PAPILLOMA VIRUS FINAL DIAGNOSIS: NORMAL
HUMAN PAPILLOMA VIRUS OTHER HR: NEGATIVE

## 2022-06-28 ENCOUNTER — VIRTUAL VISIT (OUTPATIENT)
Dept: FAMILY MEDICINE | Facility: CLINIC | Age: 47
End: 2022-06-28
Payer: COMMERCIAL

## 2022-06-28 VITALS — BODY MASS INDEX: 24.46 KG/M2 | WEIGHT: 143.3 LBS | HEIGHT: 64 IN

## 2022-06-28 DIAGNOSIS — M54.50 MIDLINE LOW BACK PAIN WITHOUT SCIATICA, UNSPECIFIED CHRONICITY: ICD-10-CM

## 2022-06-28 DIAGNOSIS — Z12.11 SCREEN FOR COLON CANCER: Primary | ICD-10-CM

## 2022-06-28 PROCEDURE — 99213 OFFICE O/P EST LOW 20 MIN: CPT | Mod: 95 | Performed by: FAMILY MEDICINE

## 2022-06-28 RX ORDER — RIBOFLAVIN (VITAMIN B2) 100 MG
1 TABLET ORAL DAILY
Qty: 90 TABLET | Refills: 3 | Status: SHIPPED | OUTPATIENT
Start: 2022-06-28 | End: 2023-07-17

## 2022-06-28 RX ORDER — IBUPROFEN 200 MG
200 TABLET ORAL EVERY 8 HOURS PRN
COMMUNITY
Start: 2022-06-28

## 2022-06-28 NOTE — LETTER
"[facility]  [address] [place], [address]  [phone]        June 28, 2022    RE: Akila Rivers                                                                                                                                                       [address] [place] [address]            To Whom It May Concern,  Akila Rivers is a patient of mine.   She has struggled with on and off back pain since 2008.   She has had an MRI of her back and CT scan and a couple different xrays over the years.   She is taking glucosamine to try and help and will use ibuprofen as needed and over the counter pain patches.   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             Sincerely,    Genesis Oneill M.D.    "

## 2022-06-28 NOTE — PROGRESS NOTES
Akila is a 47 year old who is being evaluated via a billable video visit.      How would you like to obtain your AVS? MyChart  If the video visit is dropped, the invitation should be resent by: Text to cell phone: [phone]  Will anyone else be joining your video visit? No        Assessment & Plan     Screen for colon cancer  Handout on [facility] about colonoscopy  - GASTROENTEROLOGY ADULT REF PROCEDURE ONLY    Midline low back pain without sciatica, unspecified chronicity  Letter and handouts  - glucosamine-chondroitin 500-400 MG tablet  Dispense: 90 tablet; Refill: 3  - ibuprofen (ADVIL/MOTRIN) 200 MG tablet        15 minutes spent on the date of the encounter doing chart review, review of test results, interpretation of tests, patient visit and documentation        See Patient Instructions    Return in about 2 months (around 8/28/2022) for Physical Exam.    Genesis Oneill MD  [facility]    Chaparrita Wilburn is a 47 year old, presenting for the following health issues:  RECHECK (Back problems) and Forms (Work restrictions)  she has been working her current job for the last 4 years. 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   This is a problem for her.   She needs a note for work.   She used pain patch and that helped but she would like work restrictions.   She works for  [employer].       HPI     Concern - on-going back issues  Description: needs a work restriction letter      Past Medical History:   Diagnosis Date     Blood type O+      Headache(784.0)     when working with chemicals     Midline low back pain without sciatica 2008    had MRI and CT with no disc issues       History reviewed. No pertinent surgical history.    MEDICATIONS:  Current Outpatient Medications   Medication     glucosamine-chondroitin 500-400 MG tablet     ibuprofen (ADVIL/MOTRIN) 200 MG tablet     albuterol (PROAIR HFA/PROVENTIL HFA/VENTOLIN HFA) 108 (90 Base) MCG/ACT inhaler     Calcium Carbonate (TUMS 500 OR)      DiphenhydrAMINE HCl (BENADRYL PO)     famotidine (PEPCID) 20 MG tablet     No current facility-administered medications for this visit.       SOCIAL HISTORY:  Social History     Tobacco Use     Smoking status: Never Smoker     Smokeless tobacco: Never Used   Substance Use Topics     Alcohol use: No       Family History   Problem Relation Age of Onset     Cancer Mother         cervical     Thyroid Disease Mother         took medication for this     Family History Negative Father      Osteoporosis Paternal Uncle         60         Review of Systems   Constitutional, HEENT, cardiovascular, pulmonary, gi and gu systems are negative, except as otherwise noted.      Objective           Vitals:  No vitals were obtained today due to virtual visit.    Physical Exam   GENERAL: Healthy, alert and no distress  EYES: Eyes grossly normal to inspection.  No discharge or erythema, or obvious scleral/conjunctival abnormalities.  RESP: No audible wheeze, cough, or visible cyanosis.  No visible retractions or increased work of breathing.    SKIN: Visible skin clear. No significant rash, abnormal pigmentation or lesions.  NEURO: Cranial nerves grossly intact.  Mentation and speech appropriate for age.  PSYCH: Mentation appears normal, affect normal/bright, judgement and insight intact, normal speech and appearance well-groomed.    Office Visit on 09/15/2021   Component Date Value Ref Range Status     Interpretation 09/15/2021 Negative for Intraepithelial Lesion or Malignancy (NILM)    Final     Specimen Adequacy 09/15/2021 Satisfactory for evaluation, endocervical/transformation zone component absent   Final     Clinical Information 09/15/2021    Final                    Value:This result contains rich text formatting which cannot be displayed here.     LMP/Menopause Date 09/15/2021    Final                    Value:This result contains rich text formatting which cannot be displayed here.     Reflex Testing 09/15/2021 Yes  regardless of result   Final     Previous Abnormal? 09/15/2021    Final                    Value:This result contains rich text formatting which cannot be displayed here.     Performing Labs 09/15/2021    Final                    Value:This result contains rich text formatting which cannot be displayed here.     Other HR HPV 09/15/2021 Negative  Negative Final     HPV16 DNA 09/15/2021 Negative  Negative Final     HPV18 DNA 09/15/2021 Negative  Negative Final     FINAL DIAGNOSIS 09/15/2021    Final                    Value:This result contains rich text formatting which cannot be displayed here.               Video-Visit Details    Video Start Time: 10:17 AM    Type of service:  Video Visit    Video End Time:10:29 AM    Originating Location (pt. Location): Home    Distant Location (provider location):  [facility] [place] [place]     Platform used for Video Visit: [employer]    Armando Brantley

## 2022-11-20 ENCOUNTER — HEALTH MAINTENANCE LETTER (OUTPATIENT)
Age: 47
End: 2022-11-20

## 2023-06-15 ENCOUNTER — APPOINTMENT (OUTPATIENT)
Dept: MRI IMAGING | Facility: CLINIC | Age: 48
End: 2023-06-15
Attending: EMERGENCY MEDICINE
Payer: COMMERCIAL

## 2023-06-15 ENCOUNTER — HOSPITAL ENCOUNTER (EMERGENCY)
Facility: CLINIC | Age: 48
Discharge: HOME | End: 2023-06-15
Attending: EMERGENCY MEDICINE | Admitting: EMERGENCY MEDICINE
Payer: COMMERCIAL

## 2023-06-15 VITALS
BODY MASS INDEX: 28.64 KG/M2 | DIASTOLIC BLOOD PRESSURE: 74 MMHG | HEART RATE: 91 BPM | TEMPERATURE: 98.2 F | WEIGHT: 166.87 LBS | OXYGEN SATURATION: 100 % | SYSTOLIC BLOOD PRESSURE: 159 MMHG | RESPIRATION RATE: 16 BRPM

## 2023-06-15 DIAGNOSIS — M54.42 ACUTE LEFT-SIDED LOW BACK PAIN WITH LEFT-SIDED SCIATICA: ICD-10-CM

## 2023-06-15 LAB
ANION GAP SERPL CALCULATED.3IONS-SCNC: 12 MMOL/L (ref 7–15)
BASOPHILS # BLD AUTO: 0 10E3/UL (ref 0–0.2)
BASOPHILS NFR BLD AUTO: 0 %
BUN SERPL-MCNC: 16.6 MG/DL (ref 6–20)
CALCIUM SERPL-MCNC: 8.9 MG/DL (ref 8.6–10)
CHLORIDE SERPL-SCNC: 102 MMOL/L (ref 98–107)
CREAT SERPL-MCNC: 0.66 MG/DL (ref 0.51–0.95)
D DIMER PPP FEU-MCNC: <0.27 UG/ML FEU (ref 0–0.5)
DEPRECATED HCO3 PLAS-SCNC: 24 MMOL/L (ref 22–29)
EOSINOPHIL # BLD AUTO: 0.1 10E3/UL (ref 0–0.7)
EOSINOPHIL NFR BLD AUTO: 1 %
ERYTHROCYTE [DISTWIDTH] IN BLOOD BY AUTOMATED COUNT: 12.9 % (ref 10–15)
GFR SERPL CREATININE-BSD FRML MDRD: >90 ML/MIN/1.73M2
GLUCOSE SERPL-MCNC: 100 MG/DL (ref 70–99)
HCT VFR BLD AUTO: 39.5 % (ref 35–47)
HGB BLD-MCNC: 13.1 G/DL (ref 11.7–15.7)
IMM GRANULOCYTES # BLD: 0 10E3/UL
IMM GRANULOCYTES NFR BLD: 0 %
LYMPHOCYTES # BLD AUTO: 2.1 10E3/UL (ref 0.8–5.3)
LYMPHOCYTES NFR BLD AUTO: 37 %
MCH RBC QN AUTO: 29.9 PG (ref 26.5–33)
MCHC RBC AUTO-ENTMCNC: 33.2 G/DL (ref 31.5–36.5)
MCV RBC AUTO: 90 FL (ref 78–100)
MONOCYTES # BLD AUTO: 0.4 10E3/UL (ref 0–1.3)
MONOCYTES NFR BLD AUTO: 8 %
NEUTROPHILS # BLD AUTO: 3 10E3/UL (ref 1.6–8.3)
NEUTROPHILS NFR BLD AUTO: 54 %
NRBC # BLD AUTO: 0 10E3/UL
NRBC BLD AUTO-RTO: 0 /100
PLATELET # BLD AUTO: 257 10E3/UL (ref 150–450)
POTASSIUM SERPL-SCNC: 4.2 MMOL/L (ref 3.4–5.3)
RBC # BLD AUTO: 4.38 10E6/UL (ref 3.8–5.2)
SODIUM SERPL-SCNC: 138 MMOL/L (ref 136–145)
WBC # BLD AUTO: 5.6 10E3/UL (ref 4–11)

## 2023-06-15 PROCEDURE — 80048 BASIC METABOLIC PNL TOTAL CA: CPT | Performed by: EMERGENCY MEDICINE

## 2023-06-15 PROCEDURE — 36415 COLL VENOUS BLD VENIPUNCTURE: CPT | Performed by: EMERGENCY MEDICINE

## 2023-06-15 PROCEDURE — 72148 MRI LUMBAR SPINE W/O DYE: CPT

## 2023-06-15 PROCEDURE — 99284 EMERGENCY DEPT VISIT MOD MDM: CPT | Mod: 25

## 2023-06-15 PROCEDURE — 85025 COMPLETE CBC W/AUTO DIFF WBC: CPT | Performed by: EMERGENCY MEDICINE

## 2023-06-15 PROCEDURE — 85379 FIBRIN DEGRADATION QUANT: CPT | Performed by: EMERGENCY MEDICINE

## 2023-06-15 PROCEDURE — 250N000013 HC RX MED GY IP 250 OP 250 PS 637: Performed by: EMERGENCY MEDICINE

## 2023-06-15 RX ORDER — METHOCARBAMOL 750 MG/1
750 TABLET, FILM COATED ORAL 4 TIMES DAILY PRN
Qty: 12 TABLET | Refills: 0 | Status: SHIPPED | OUTPATIENT
Start: 2023-06-15 | End: 2023-07-17

## 2023-06-15 RX ORDER — ACETAMINOPHEN 325 MG/1
650 TABLET ORAL ONCE
Status: COMPLETED | OUTPATIENT
Start: 2023-06-15 | End: 2023-06-15

## 2023-06-15 RX ORDER — METHYLPREDNISOLONE 4 MG
TABLET, DOSE PACK ORAL
Qty: 21 TABLET | Refills: 0 | Status: SHIPPED | OUTPATIENT
Start: 2023-06-15 | End: 2023-07-17

## 2023-06-15 RX ADMIN — ACETAMINOPHEN 650 MG: 325 TABLET, FILM COATED ORAL at 18:00

## 2023-06-15 ASSESSMENT — ACTIVITIES OF DAILY LIVING (ADL): ADLS_ACUITY_SCORE: 37

## 2023-06-15 NOTE — ED NOTES
PIT/Triage Evaluation    Patient presented with left-leg numbness/pain for the past 1-2 weeks. Patient reports that she is sometimes unable to walk due to intermittent pain. 2 weeks ago, her pain was located in her lower back, which shot down her leg. As of a week ago, her pain is primarily located in her left knee. Putting oil on and massaging her left leg alleviates some of the pain.     She endorses bruising (with are not from an injury) to both legs as well as hip and foot pain on the left side. She denies any fevers, continued low back pain, urinary issues, numbness in the groin, bowel issues, or right leg symptoms. No swelling or redness. Takes ibuprofen and aspirin.     Exam is notable for:  Constitutional:       General: Not in acute distress.     Appearance: Normal appearance.   HENT:      Head: Normocephalic and atraumatic.   Eyes:      Extraocular Movements: Extraocular movements intact.      Conjunctiva/sclera: Conjunctivae normal.   Cardiovascular:      Rate and Rhythm: Normal rate and regular rhythm.   Pulmonary:      Effort: Pulmonary effort is normal. No respiratory distress.      Breath sounds: Normal breath sounds.   Abdominal:      General: Abdomen is flat. There is no distension.      Palpations: Abdomen is soft.      Tenderness: There is no abdominal tenderness.   Musculoskeletal:      Cervical back: Normal range of motion and neck supple. No rigidity or tenderness.      Back Exam: No midline C/T/L spine tenderness to palpation.     Lower Extremity: 5/5 Bilateral hip flexion, hip extension, knee flexion, knee extension, ankle flexion, and ankle extension. Diminished sensation in left thigh.     Right lower leg: No edema.      Left lower leg: No edema.   Skin:     General: Skin is warm and dry.   Neurological:      General: No focal deficit present.      Mental Status: Alert and oriented to person, place, and time.   Psychiatric:         Mood and Affect: Mood normal.         Behavior: Behavior  normal.    Appropriate interventions for symptom management were initiated if applicable.  Appropriate diagnostic tests were initiated if indicated.    Important information for subsequent clinician:    I briefly evaluated the patient and developed an initial plan of care. I discussed this plan and explained that this brief interaction does not constitute a full evaluation. Patient/family understands that they should wait to be fully evaluated and discuss any test results with another clinician prior to leaving the hospital.       Kervin Keys DO  06/15/23 [id number]

## 2023-06-15 NOTE — ED PROVIDER NOTES
History     Chief Complaint:  Back pain      The history is provided by the patient and a relative. No  was used (patient opted for relative to translate).      Akila Rivers is a 48 year old female with history of anemia who presents to the ED via car with 2 family members for evaluation of back pain. Patient endorses left sided lower back pain radiating down to her left leg for the past 2 weeks. She states in the morning the pain is worse and makes it difficult to walk. She reports use of ibuprofen for pain. She notes she has had no recent injury to her back or leg, but states about 2 months she did fall. She reports no pain on the right sided back or leg. No bowel or urinary issues reported.     She notes she's also had a headache for the past 3 days. She reports sometimes her left arm is sometimes is numb, but she feels more numbness in her left leg. She states she has trouble reading at times.      No chest pain, abdominal pain, neck pain, weakness, or leg swelling. She has noted some abnormal bruising on both thighs despite no trauma.  Patient states she has a PCP.     MRI of the lumbar spine w/o contrast- [facility]  IMPRESSION:   1. No disc herniation or stenosis.   2. Left L5 pars defect cannot be excluded, although this is not   definite.   3. Additional findings discussed above.    Independent Historian:   None - Patient Only    Review of External Notes:   Reviewed virtual visit note from 6/28/2022 when patient noted that she had low back pain related to heavy lifting at work.    Medications:    Albuterol inhaler   Pepcid    Past Medical History:    Blood type O+  GERD  Anemia   Irritable bowel syndrome   Family history of osteoporosis     Physical Exam     Patient Vitals for the past 24 hrs:   BP Temp Temp src Pulse Resp SpO2 Weight   06/15/23 1548 (!) 159/74 98.2  F (36.8  C) Temporal 91 16 100 % 75.7 kg (166 lb 13.9 oz)      Physical  Exam  Vitals and nursing note reviewed.   Constitutional:       General: She is not in acute distress.     Appearance: She is not ill-appearing.   HENT:      Head: Normocephalic and atraumatic.      Right Ear: External ear normal.      Left Ear: External ear normal.      Nose: Nose normal.   Eyes:      Extraocular Movements: Extraocular movements intact.      Conjunctiva/sclera: Conjunctivae normal.   Cardiovascular:      Rate and Rhythm: Normal rate and regular rhythm.      Pulses: Normal pulses.           Dorsalis pedis pulses are 2+ on the left side.        Posterior tibial pulses are 2+ on the left side.      Heart sounds: No murmur heard.  Pulmonary:      Effort: Pulmonary effort is normal. No respiratory distress.      Breath sounds: Normal breath sounds. No wheezing, rhonchi or rales.   Abdominal:      General: Abdomen is flat. Bowel sounds are normal. There is no distension.      Palpations: Abdomen is soft.      Tenderness: There is no abdominal tenderness. There is no guarding or rebound.   Musculoskeletal:         General: No swelling, tenderness, deformity or signs of injury.      Cervical back: Normal range of motion and neck supple.   Skin:     General: Skin is warm and dry.      Findings: Bruising (Small areas of bruising to bilateral medial thighs) present. No rash.   Neurological:      Mental Status: She is alert and oriented to person, place, and time.      Cranial Nerves: No cranial nerve deficit.      Sensory: No sensory deficit.      Motor: No weakness.   Psychiatric:         Behavior: Behavior normal.           Emergency Department Course     Imaging:  Lumbar spine MRI w/o contrast   Final Result   IMPRESSION:   1.  Mild lumbar disc degeneration and facet hypertrophy without spinal canal or foraminal stenosis.         Report per radiology    Laboratory:  Labs Ordered and Resulted from Time of ED Arrival to Time of ED Departure   BASIC METABOLIC PANEL - Abnormal       Result Value    Sodium 138       Potassium 4.2      Chloride 102      Carbon Dioxide (CO2) 24      Anion Gap 12      Urea Nitrogen 16.6      Creatinine 0.66      Calcium 8.9      Glucose 100 (*)     GFR Estimate >90     D DIMER QUANTITATIVE - Normal    D-Dimer Quantitative <0.27     CBC WITH PLATELETS AND DIFFERENTIAL    WBC Count 5.6      RBC Count 4.38      Hemoglobin 13.1      Hematocrit 39.5      MCV 90      MCH 29.9      MCHC 33.2      RDW 12.9      Platelet Count 257      % Neutrophils 54      % Lymphocytes 37      % Monocytes 8      % Eosinophils 1      % Basophils 0      % Immature Granulocytes 0      NRBCs per 100 WBC 0      Absolute Neutrophils 3.0      Absolute Lymphocytes 2.1      Absolute Monocytes 0.4      Absolute Eosinophils 0.1      Absolute Basophils 0.0      Absolute Immature Granulocytes 0.0      Absolute NRBCs 0.0        Emergency Department Course & Assessments:     Interventions:  Medications   acetaminophen (TYLENOL) tablet 650 mg (650 mg Oral $Given 6/15/23 1800)      Independent Interpretation (X-rays, CTs, rhythm strip):  None    Assessments/Consultations/Discussion of Management or Tests:  ED Course as of 06/15/23 1950   Thu Adrian 15, 2023   1701 I obtained history and examined the patient as noted above.     I rechecked the patient and explained findings.      Social Determinants of Health affecting care:   None    Disposition:  The patient was discharged to home.     Impression & Plan      Medical Decision Makin-year-old female presenting with 2 weeks of low back pain radiating down the left leg.  Suspect that she has a lumbar radiculopathy or sciatica.  She does note some intermittent numbness and limitation of ambulation due to pain, but there is no neurologic deficits on her exam in the ED.  The left leg does not appear significantly swollen, but she does have some bruising noted in bilateral thighs.  We did send a D-dimer to help rule out a DVT and this did come back normal.  She has normal pulses in  the left foot as well so have low suspicion for an vascular etiology.  MRI of the lumbar spine was performed and did not show any significant disc herniation or other acute lesions.  We will plan to treat the patient with a Medrol Dosepak and Robaxin for muscle relaxant.  Recommend that he follow-up with primary care physician next week.  We discussed return precautions.    Diagnosis:    ICD-10-CM    1. Acute left-sided low back pain with left-sided sciatica  M54.42            Discharge Medications:  Discharge Medication List as of 6/15/2023  6:40 PM      START taking these medications    Details   methocarbamol (ROBAXIN) 750 MG tablet Take 1 tablet (750 mg) by mouth 4 times daily as needed for muscle spasms, Disp-12 tablet, R-0, E-Prescribe      methylPREDNISolone (MEDROL DOSEPAK) 4 MG tablet therapy pack Follow Package Directions, Disp-21 tablet, R-0, E-Prescribe            Scribe Disclosure:  Kirt LEE, am serving as a scribe at 5:46 PM on 6/15/2023 to document services personally performed by Joseph Limon MD based on my observations and the provider's statements to me.     6/15/2023   Joseph Limon MD Goodwin, Shaun M, MD  06/15/23 1954

## 2023-06-15 NOTE — ED TRIAGE NOTES
LLE numbness x1-2 weeks with intermittent difficulty with ambulation. Pt reports multiple bruises to bilateral thighs, no known injuries. ABCs intact.

## 2023-07-17 ENCOUNTER — ANCILLARY PROCEDURE (OUTPATIENT)
Dept: GENERAL RADIOLOGY | Facility: CLINIC | Age: 48
End: 2023-07-17
Attending: FAMILY MEDICINE
Payer: COMMERCIAL

## 2023-07-17 ENCOUNTER — OFFICE VISIT (OUTPATIENT)
Dept: FAMILY MEDICINE | Facility: CLINIC | Age: 48
End: 2023-07-17
Payer: COMMERCIAL

## 2023-07-17 VITALS
OXYGEN SATURATION: 92 % | TEMPERATURE: 98.4 F | BODY MASS INDEX: 28.54 KG/M2 | SYSTOLIC BLOOD PRESSURE: 139 MMHG | WEIGHT: 167.2 LBS | DIASTOLIC BLOOD PRESSURE: 89 MMHG | HEART RATE: 85 BPM | HEIGHT: 64 IN

## 2023-07-17 DIAGNOSIS — Z12.31 VISIT FOR SCREENING MAMMOGRAM: ICD-10-CM

## 2023-07-17 DIAGNOSIS — M54.50 MIDLINE LOW BACK PAIN WITHOUT SCIATICA, UNSPECIFIED CHRONICITY: ICD-10-CM

## 2023-07-17 DIAGNOSIS — Z13.6 CARDIOVASCULAR SCREENING; LDL GOAL LESS THAN 160: ICD-10-CM

## 2023-07-17 DIAGNOSIS — Z12.11 SCREEN FOR COLON CANCER: ICD-10-CM

## 2023-07-17 DIAGNOSIS — R53.83 OTHER FATIGUE: ICD-10-CM

## 2023-07-17 DIAGNOSIS — M25.562 ACUTE PAIN OF LEFT KNEE: Primary | ICD-10-CM

## 2023-07-17 LAB
ALBUMIN SERPL BCG-MCNC: 4.5 G/DL (ref 3.5–5.2)
ALP SERPL-CCNC: 77 U/L (ref 35–104)
ALT SERPL W P-5'-P-CCNC: 18 U/L (ref 0–50)
ANION GAP SERPL CALCULATED.3IONS-SCNC: 10 MMOL/L (ref 7–15)
AST SERPL W P-5'-P-CCNC: 27 U/L (ref 0–45)
BILIRUB SERPL-MCNC: 0.4 MG/DL
BUN SERPL-MCNC: 11.5 MG/DL (ref 6–20)
CALCIUM SERPL-MCNC: 9 MG/DL (ref 8.6–10)
CHLORIDE SERPL-SCNC: 105 MMOL/L (ref 98–107)
CHOLEST SERPL-MCNC: 195 MG/DL
CREAT SERPL-MCNC: 0.72 MG/DL (ref 0.51–0.95)
DEPRECATED HCO3 PLAS-SCNC: 25 MMOL/L (ref 22–29)
ERYTHROCYTE [DISTWIDTH] IN BLOOD BY AUTOMATED COUNT: 12.7 % (ref 10–15)
GFR SERPL CREATININE-BSD FRML MDRD: >90 ML/MIN/1.73M2
GLUCOSE SERPL-MCNC: 92 MG/DL (ref 70–99)
HCT VFR BLD AUTO: 37.4 % (ref 35–47)
HDLC SERPL-MCNC: 47 MG/DL
HGB BLD-MCNC: 12.2 G/DL (ref 11.7–15.7)
LDLC SERPL CALC-MCNC: 125 MG/DL
MCH RBC QN AUTO: 29.3 PG (ref 26.5–33)
MCHC RBC AUTO-ENTMCNC: 32.6 G/DL (ref 31.5–36.5)
MCV RBC AUTO: 90 FL (ref 78–100)
NONHDLC SERPL-MCNC: 148 MG/DL
PLATELET # BLD AUTO: 277 10E3/UL (ref 150–450)
POTASSIUM SERPL-SCNC: 4.1 MMOL/L (ref 3.4–5.3)
PROT SERPL-MCNC: 6.8 G/DL (ref 6.4–8.3)
RBC # BLD AUTO: 4.16 10E6/UL (ref 3.8–5.2)
SODIUM SERPL-SCNC: 140 MMOL/L (ref 136–145)
TRIGL SERPL-MCNC: 117 MG/DL
TSH SERPL DL<=0.005 MIU/L-ACNC: 0.59 UIU/ML (ref 0.3–4.2)
WBC # BLD AUTO: 5.5 10E3/UL (ref 4–11)

## 2023-07-17 PROCEDURE — 90715 TDAP VACCINE 7 YRS/> IM: CPT | Performed by: FAMILY MEDICINE

## 2023-07-17 PROCEDURE — 73562 X-RAY EXAM OF KNEE 3: CPT | Mod: TC | Performed by: RADIOLOGY

## 2023-07-17 PROCEDURE — 80053 COMPREHEN METABOLIC PANEL: CPT | Performed by: FAMILY MEDICINE

## 2023-07-17 PROCEDURE — 80061 LIPID PANEL: CPT | Performed by: FAMILY MEDICINE

## 2023-07-17 PROCEDURE — 85027 COMPLETE CBC AUTOMATED: CPT | Performed by: FAMILY MEDICINE

## 2023-07-17 PROCEDURE — 36415 COLL VENOUS BLD VENIPUNCTURE: CPT | Performed by: FAMILY MEDICINE

## 2023-07-17 PROCEDURE — 84443 ASSAY THYROID STIM HORMONE: CPT | Performed by: FAMILY MEDICINE

## 2023-07-17 PROCEDURE — 90471 IMMUNIZATION ADMIN: CPT | Performed by: FAMILY MEDICINE

## 2023-07-17 PROCEDURE — 99214 OFFICE O/P EST MOD 30 MIN: CPT | Mod: 25 | Performed by: FAMILY MEDICINE

## 2023-07-17 RX ORDER — CHOLECALCIFEROL (VITAMIN D3) 50 MCG
1 TABLET ORAL DAILY
Qty: 100 TABLET | Refills: 4 | Status: SHIPPED | OUTPATIENT
Start: 2023-07-17

## 2023-07-17 RX ORDER — RIBOFLAVIN (VITAMIN B2) 100 MG
1 TABLET ORAL DAILY
Qty: 90 TABLET | Refills: 3 | Status: SHIPPED | OUTPATIENT
Start: 2023-07-17 | End: 2024-04-17

## 2023-07-17 ASSESSMENT — PAIN SCALES - GENERAL: PAINLEVEL: SEVERE PAIN (6)

## 2023-07-17 NOTE — PATIENT INSTRUCTIONS
You may schedule your mammogram at [facility] by calling [phone] and asking to schedule your mammogram or you may schedule with [facility] in [place] by calling [phone].

## 2023-07-17 NOTE — PROGRESS NOTES
"  Assessment & Plan     Screen for colon cancer    - Colonoscopy Screening  Referral    Visit for screening mammogram    - MA SCREENING DIGITAL BILAT - Future  (s+30)    Acute pain of left knee  Bruising vs meniscal - no evidence on exam and getting better.   Could it have been piriformis syndrome - could consider PT if not better in 1-2 months completely     - REVIEW OF HEALTH MAINTENANCE PROTOCOL ORDERS  - XR Knee Left 3 Views  - vitamin D3 (CHOLECALCIFEROL) 50 mcg (2000 units) tablet  Dispense: 100 tablet; Refill: 4    Other fatigue    - CBC with platelets  - TSH with free T4 reflex  - Comprehensive metabolic panel (BMP + Alb, Alk Phos, ALT, AST, Total. Bili, TP)  - CBC with platelets  - TSH with free T4 reflex  - Comprehensive metabolic panel (BMP + Alb, Alk Phos, ALT, AST, Total. Bili, TP)    CARDIOVASCULAR SCREENING; LDL GOAL LESS THAN 160    - Lipid panel reflex to direct LDL Fasting  - Lipid panel reflex to direct LDL Fasting    Midline low back pain without sciatica, unspecified chronicity    - glucosamine-chondroitin 500-400 MG tablet  Dispense: 90 tablet; Refill: 3        32 minutes spent by me on the date of the encounter doing chart review, history and exam, documentation and further activities per the note       BMI:   Estimated body mass index is 28.7 kg/m  as calculated from the following:    Height as of this encounter: 1.626 m (5' 4\").    Weight as of this encounter: 75.8 kg (167 lb 3.2 oz).       See Patient Instructions    Genesis Oneill MD  [employer] MARY BETH Wilburn is a 48 year old, presenting for the following health issues:  Musculoskeletal Problem (Leg pain (left) two months (was bruised) injury playing (better now))  she notes that she feel weak overall and like her muscles are not very strong.   She got supplement a few years ago that seemed to help and would like again.   Also her left knee was really hurting a few weeks back.   At that time it " was felt that it could be related to her back.   She had MRI which was okay with no nerve impingement.    She got prednisone and a muscle relaxer and it is almost all better.   The back does not hurt but the left knee still hurts some.   She fell on it last fall and the car door hit it once and a soccer ball too.   She did have some bruising for a period of time.             7/17/2023     1:26 PM   Additional Questions   Roomed by hira   Accompanied by Keren daughter     Musculoskeletal Problem    History of Present Illness       Reason for visit:  The pain is severe in my knee bones and muscles    She eats 2-3 servings of fruits and vegetables daily.She consumes 1 sweetened beverage(s) daily.She exercises with enough effort to increase her heart rate 60 or more minutes per day.  She exercises with enough effort to increase her heart rate 3 or less days per week.   She is taking medications regularly.       Past Medical History:   Diagnosis Date     Blood type O+      Headache(784.0)     when working with chemicals     Midline low back pain without sciatica 2008    had MRI and CT with no disc issues       No past surgical history on file.    MEDICATIONS:  Current Outpatient Medications   Medication     albuterol (PROAIR HFA/PROVENTIL HFA/VENTOLIN HFA) 108 (90 Base) MCG/ACT inhaler     Calcium Carbonate (TUMS 500 OR)     DiphenhydrAMINE HCl (BENADRYL PO)     famotidine (PEPCID) 20 MG tablet     glucosamine-chondroitin 500-400 MG tablet     ibuprofen (ADVIL/MOTRIN) 200 MG tablet     vitamin D3 (CHOLECALCIFEROL) 50 mcg (2000 units) tablet     No current facility-administered medications for this visit.       SOCIAL HISTORY:  Social History     Tobacco Use     Smoking status: Never     Smokeless tobacco: Never   Substance Use Topics     Alcohol use: No       Family History   Problem Relation Age of Onset     Cancer Mother         cervical     Thyroid Disease Mother         took medication for this     Family  "History Negative Father      Osteoporosis Paternal Uncle         60           Review of Systems   Constitutional, HEENT, cardiovascular, pulmonary, gi and gu systems are negative, except as otherwise noted.      Objective    /89 (BP Location: Right arm, Patient Position: Sitting, Cuff Size: Adult Regular)   Pulse 85   Temp 98.4  F (36.9  C) (Oral)   Ht 1.626 m (5' 4\")   Wt 75.8 kg (167 lb 3.2 oz)   LMP 07/10/2023 (Approximate)   SpO2 92%   BMI 28.70 kg/m    Body mass index is 28.7 kg/m .  Physical Exam   GENERAL: healthy, alert and no distress  EYES: Eyes grossly normal to inspection, PERRL and conjunctivae and sclerae normal  HENT: ear canals and TM's normal, nose and mouth without ulcers or lesions  NECK: no adenopathy, no asymmetry, masses, or scars and thyroid normal to palpation  RESP: lungs clear to auscultation - no rales, rhonchi or wheezes  CV: regular rate and rhythm, normal S1 S2, no S3 or S4, no murmur, click or rub, no peripheral edema and peripheral pulses strong  SKIN: no suspicious lesions or rashes  NEURO: Normal strength and tone, mentation intact and speech normal  PSYCH: mentation appears normal, affect normal/bright  LYMPH: no cervical, supraclavicular, axillary, or inguinal adenopathy  Left Knee exam:  The knees were without swelling or redness or effusion.  There was full range of motion and no crepitus with flexion and extension.  Anterior drawer sign was negative.  Ace's was negative.    There was a negative patellar apprehension test.  There was no medial joint line tenderness and no lateral joint line tenderness.     Xray - Reviewed and interpreted by me.  Pending                 "

## 2023-08-14 ENCOUNTER — ANCILLARY PROCEDURE (OUTPATIENT)
Dept: MAMMOGRAPHY | Facility: CLINIC | Age: 48
End: 2023-08-14
Attending: FAMILY MEDICINE
Payer: COMMERCIAL

## 2023-08-14 DIAGNOSIS — Z12.31 VISIT FOR SCREENING MAMMOGRAM: ICD-10-CM

## 2023-08-14 PROCEDURE — 77063 BREAST TOMOSYNTHESIS BI: CPT | Mod: TC | Performed by: RADIOLOGY

## 2023-08-14 PROCEDURE — 77067 SCR MAMMO BI INCL CAD: CPT | Mod: TC | Performed by: RADIOLOGY

## 2023-08-18 ENCOUNTER — HOSPITAL ENCOUNTER (OUTPATIENT)
Dept: ULTRASOUND IMAGING | Facility: CLINIC | Age: 48
Discharge: HOME | End: 2023-08-18
Attending: FAMILY MEDICINE | Admitting: FAMILY MEDICINE
Payer: COMMERCIAL

## 2023-08-18 DIAGNOSIS — R92.8 ABNORMAL MAMMOGRAM: ICD-10-CM

## 2023-08-18 PROCEDURE — 76642 ULTRASOUND BREAST LIMITED: CPT | Mod: LT

## 2023-11-25 ENCOUNTER — HEALTH MAINTENANCE LETTER (OUTPATIENT)
Age: 48
End: 2023-11-25

## 2024-04-17 ENCOUNTER — ORDERS ONLY (AUTO-RELEASED) (OUTPATIENT)
Dept: FAMILY MEDICINE | Facility: CLINIC | Age: 49
End: 2024-04-17

## 2024-04-17 ENCOUNTER — OFFICE VISIT (OUTPATIENT)
Dept: FAMILY MEDICINE | Facility: CLINIC | Age: 49
End: 2024-04-17
Attending: FAMILY MEDICINE
Payer: COMMERCIAL

## 2024-04-17 VITALS
WEIGHT: 165 LBS | SYSTOLIC BLOOD PRESSURE: 122 MMHG | HEIGHT: 64 IN | TEMPERATURE: 98.1 F | HEART RATE: 72 BPM | RESPIRATION RATE: 16 BRPM | OXYGEN SATURATION: 100 % | DIASTOLIC BLOOD PRESSURE: 85 MMHG | BODY MASS INDEX: 28.17 KG/M2

## 2024-04-17 DIAGNOSIS — Z12.11 SCREEN FOR COLON CANCER: ICD-10-CM

## 2024-04-17 DIAGNOSIS — J98.01 ACUTE BRONCHOSPASM: ICD-10-CM

## 2024-04-17 DIAGNOSIS — K21.9 GASTROESOPHAGEAL REFLUX DISEASE WITHOUT ESOPHAGITIS: ICD-10-CM

## 2024-04-17 DIAGNOSIS — N95.1 MENOPAUSAL SYNDROME (HOT FLASHES): ICD-10-CM

## 2024-04-17 DIAGNOSIS — Z00.00 ROUTINE GENERAL MEDICAL EXAMINATION AT A HEALTH CARE FACILITY: Primary | ICD-10-CM

## 2024-04-17 PROCEDURE — 80061 LIPID PANEL: CPT | Performed by: FAMILY MEDICINE

## 2024-04-17 PROCEDURE — 90746 HEPB VACCINE 3 DOSE ADULT IM: CPT | Performed by: FAMILY MEDICINE

## 2024-04-17 PROCEDURE — 36415 COLL VENOUS BLD VENIPUNCTURE: CPT | Performed by: FAMILY MEDICINE

## 2024-04-17 PROCEDURE — 83001 ASSAY OF GONADOTROPIN (FSH): CPT | Performed by: FAMILY MEDICINE

## 2024-04-17 PROCEDURE — 99214 OFFICE O/P EST MOD 30 MIN: CPT | Mod: 25 | Performed by: FAMILY MEDICINE

## 2024-04-17 PROCEDURE — 84443 ASSAY THYROID STIM HORMONE: CPT | Performed by: FAMILY MEDICINE

## 2024-04-17 PROCEDURE — 99396 PREV VISIT EST AGE 40-64: CPT | Mod: 25 | Performed by: FAMILY MEDICINE

## 2024-04-17 PROCEDURE — 90471 IMMUNIZATION ADMIN: CPT | Performed by: FAMILY MEDICINE

## 2024-04-17 RX ORDER — ALBUTEROL SULFATE 90 UG/1
2 AEROSOL, METERED RESPIRATORY (INHALATION) EVERY 4 HOURS PRN
Qty: 18 G | Refills: 2 | Status: SHIPPED | OUTPATIENT
Start: 2024-04-17

## 2024-04-17 RX ORDER — GABAPENTIN 100 MG/1
100 CAPSULE ORAL AT BEDTIME
Qty: 90 CAPSULE | Refills: 4 | Status: SHIPPED | OUTPATIENT
Start: 2024-04-17

## 2024-04-17 RX ORDER — FAMOTIDINE 20 MG/1
20 TABLET, FILM COATED ORAL 2 TIMES DAILY PRN
Qty: 30 TABLET | Refills: 11 | Status: SHIPPED | OUTPATIENT
Start: 2024-04-17

## 2024-04-17 SDOH — HEALTH STABILITY: PHYSICAL HEALTH: ON AVERAGE, HOW MANY DAYS PER WEEK DO YOU ENGAGE IN MODERATE TO STRENUOUS EXERCISE (LIKE A BRISK WALK)?: 2 DAYS

## 2024-04-17 SDOH — HEALTH STABILITY: PHYSICAL HEALTH: ON AVERAGE, HOW MANY MINUTES DO YOU ENGAGE IN EXERCISE AT THIS LEVEL?: 20 MIN

## 2024-04-17 SDOH — HEALTH STABILITY: PHYSICAL HEALTH: ON AVERAGE, HOW MANY DAYS PER WEEK DO YOU ENGAGE IN MODERATE TO STRENUOUS EXERCISE (LIKE A BRISK WALK)?: 3 DAYS

## 2024-04-17 ASSESSMENT — SOCIAL DETERMINANTS OF HEALTH (SDOH)
DO YOU BELONG TO ANY CLUBS OR ORGANIZATIONS SUCH AS CHURCH GROUPS UNIONS, FRATERNAL OR ATHLETIC GROUPS, OR SCHOOL GROUPS?: NO
IN A TYPICAL WEEK, HOW MANY TIMES DO YOU TALK ON THE PHONE WITH FAMILY, FRIENDS, OR NEIGHBORS?: MORE THAN THREE TIMES A WEEK
HOW OFTEN DO YOU GET TOGETHER WITH FRIENDS OR RELATIVES?: ONCE A WEEK
HOW OFTEN DO YOU ATTENT MEETINGS OF THE CLUB OR ORGANIZATION YOU BELONG TO?: 1 TO 4 TIMES PER YEAR
HOW OFTEN DO YOU GET TOGETHER WITH FRIENDS OR RELATIVES?: TWICE A WEEK

## 2024-04-17 ASSESSMENT — LIFESTYLE VARIABLES: HOW OFTEN DO YOU HAVE A DRINK CONTAINING ALCOHOL: NEVER

## 2024-04-17 NOTE — COMMUNITY RESOURCES LIST (PATIENT PREFERRED LANGUAGE)
April 17, 2024            ????? ??????? ???????? ??????? ??           ????? ???????    ????? ???? ???????      International - Community Health Worker HUB  [address] [place] [address] (?????: 16.1 ?????)  ????: [phone]  ???? ????????: [email or web address]  ???? ????????: [email or web address]  ???: ??????????? ?????????? ???????  ??????: ?????? ?????      [facility] for Humanity - Homeownership Program  [address] [place] [address] (?????: 17.5 ?????)  ????: [phone]  ???: ???????  ??????: ????? ??????  ??????? ??????: ???? ?????? ??? Ada      [employer] - Housing Stabilization Services  ????: [phone]  ???? ????????: [email or web address]  ???: ???????  ?????: ??? 8:00 ?????? - 4:00 ????? ??? 8:00 ?????? - 4:00 ????? ??? 8:00 ?????? - 4:00 ????? ??? 8:00 ?????? - 4:00 ????? ??? 8:00 ?????? - 4:00 ?????  ??????: ??  ??????? ??????: ??? ????????? ?? ???? ?? ???? ?????  ?????? ?????: ????? ?????    ???????? ?? ??? ???????/????? ???????      [facility] Community Development Agency (CDA) - Homebuyer Counseling - Rent and Mortgage Payment Assistance  [address] [place] [address] (?????: 9.0 ?????)  ???: ???????  ??????: ??  ??????? ??????: ????? ???????? ???????? ?????? ??? Ada? ?????? ?????????      Action Partnership (CAP) of [facility] - Family Homeless Prevention Assistance Program (FHPAP)  [address] [place] [address] (?????: 9.2 ?????)  ???: ??????????? ?????????  ??????: ??      30-Days Foundation - Keep the Key  ????: [phone]  ???? ????????: https://www.ktd33-[email or web address]/programs.html  ???: ???????  ?????: ??? 7:00 ?????? - 7:00 ????? ??? 7:00 ?????? - 7:00 ????? ??? 7:00 ?????? - 7:00 ????? ??? 7:00 ?????? - 7:00 ????? ??? 7:00 ?????? - 7:00 ?????  ??????: ??    ??????? ?? ??????? ???? ???????      [facility] Community Development Agency (CDA) - [facility]  Prevention Counseling  [address] [place] [address] (?????: 9.0 ?????)  ????: [phone]  ???: ???????  ??????: ??  ??????? ??????: ????? ???????? ???????? ?????? ??? Ada? ?????? ?????????      [facility] for Humanity - Mortgage Foreclosure Prevention  [address] [place] [address] (?????: 17.5 ?????)  ????: [phone]  ???: ???????  ??????: ??  ??????? ??????: ???? ?????? ??? Ada      Line - Tenant Rights / Eviction Prevention  ???? ????????: [email or web address]  ???: ??????????? ?????????        ?????    ???????? ?? ??? ?????? ???????      [facility] - [place] - [facility] - [facility] [employer] - [place] Outpost  [address] [place] [address] (?????: 4.0 ?????)  ????: [phone]  ???: ???????  ??????: ??  ??????? ??????: ???? ?????? ??? Ada      Action Partnership (CAP) of [facility] - Energy Assistance Program (EAP)  [address] [place] [address] (?????: 9.2 ?????)  ????: [phone]  ???: ??????????? ?????????  ??????: ??      - Dislocated Worker/Adult [employer] Employment Program  ????: [phone]  ???? ????????: [email or web address].[employer]  ???? ????????: [email or web address]://[email or web address].[employer]/services/employment-services/dislocated-worker-program/  ???: ??????????? ?????????  ?????: ??? 8:00 ?????? - 4:30 ????? ??? 8:00 ?????? - 4:30 ????? ??? 8:00 ?????? - 4:30 ????? ??? 8:00 ?????? - 4:30 ????? ??? 8:00 ?????? - 4:30 ?????  ??????: ??  ??????? ??????: ???? ?????? ??? Ada               ????? ?????? ????        ????? ???????  911  .   ????? ?????  211 [email or web address]  .   ?????? ??????  [phone] http://[email or web address] [email or web address]  .     ???????? ???????? ????? ??????  988 http://[email or web address] Memorial Hospitalline.org  .   ???? ?????? ?????? ?????? ?????? ??????? ?? [facility]  [phone] http://ChildTrinity Health System Twin City Medical Centerotline.org   .   ???? ?????? ?????? ???????? ??????  [phone] (?????) http://[email or web address]   .     ???? ????? ??????  ??????  [phone] (????) http://[email or web address]  .   ??? ????? ???? ???????  ????/???? ????? ???? ??? ????? ?[phone] [place]: http://ppsupportmn.org ????????: http://Enders Fund.com/wi  .   ?? ???????? ?????? ?????? ???????? ([facility])  [phone] ([phone]) http://Find treatment.gov   .                ????? ?????????: ?? ????? ??? ??????? ??? ???? Unite Us. ?? ????? ???? Unite Us ??? ?? ?? ????? ??????? ????????? ?? ????? ??????? ???. ?? ???? ???? Unite Us ?? ??????? ???????? ?? ????? ??????? ??? ????? ????? ?? ?? ???? ????? ???? ?? ??????.    UTC

## 2024-04-17 NOTE — COMMUNITY RESOURCES LIST (PATIENT PREFERRED LANGUAGE)
April 17, 2024            ????? ??????? ???????? ??????? ??           ????? ???????    ??? ????? ???????      CASA - Mi CASA Benefit Navigation - Setswana  [address] [place] [address] (?????: 10.0 ?????)  ????: [phone]  ???? ????????: [email or web address]  ???: ??????????? ?????????  ??????: ??  ??????? ??????: ????? ???????      Action Partnership (CAP) of [place] - [facility] application assistance  [address] [place] [address] (?????: 9.2 ?????)  ???: ??????????? ?????????  ??????: ??      Sure - Navigators  ????: [phone]  ???? ????????: https://www.mnsure.org/about-us/assister-program/navigators/index.jsp  ???: ???????  ?????: ??? 8:00 ?????? - 4:00 ????? ??? 8:00 ?????? - 4:00 ????? ??? 8:00 ?????? - 4:00 ????? ??? 8:00 ?????? - 4:00 ?????        ???????? ????????    ????? ??????? ??????      Action Partnership (CAP) of [place] - [facility] application assistance  [address] [place] [address] (?????: 11.7 ?????)  ????: [phone]  ???: ??????????? ?????????  ??????: ??      Action Partnership (CAP) of [place] - SNAP application assistance  [address] [place] [address] (?????: 9.2 ?????)  ???: ??????????? ?????????  ??????: ??      [employer] [place] - SNAP (formerly food stamps) Screening and Application help  ????: [phone]  ???? ????????: https://www.[employer].org/programs/[place]-food-helpline/  ???: ???????  ?????: ??? 10:00 ?????? - 5:00 ????? ??? 10:00 ?????? - 5:00 ????? ??? 10:00 ?????? - 5:00 ????? ??? 10:00 ?????? - 5:00 ????? ??? 10:00 ?????? - 5:00 ?????  ??????: ??  ??????? ??????: ???? ?????? ??? Ada? ??? ?????????? ???? ?? ???? ?????? ????? ???????    ???? ??????      [facility] - [place] - Food Shelf - [facility] [place]  [address] [place] [address] (?????: 4.0 ?????)  ????: [phone]  ???: ???????  ??????: ??  ??????? ??????: ????  ?????? ??? Ada      Action Partnership (CAP) of [place], & [facility] - Food Shelf  [address] [place] [address] (?????: 9.2 ?????)  ????: [phone]  ???: ??????????? ?????????  ??????: ??      EMpowered - EMpowerement Food Bank  ????: [phone]  ???? ????????: [email or web address]://[email or web address]/empowerment-food-bank  ???: ???????  ?????: ??? 9:00 ?????? - 5:00 ????? ??? 9:00 ?????? - 5:00 ????? ??? 9:00 ?????? - 5:00 ????? ??? 9:00 ?????? - 5:00 ????? ??? 9:00 ?????? - 5:00 ?????  ??????: ??        ?????    ???????? ?? ??? ?????? ???????      [facility] - [facility] - [facility] [employer] - [place] Outpost  [address] [place] [address] (?????: 4.0 ?????)  ????: [phone]  ???: ???????  ??????: ??  ??????? ??????: ???? ?????? ??? Ada      Action Partnership (CAP) of [place] [facility] - Energy Assistance Program (EAP)  [address] [place] [address] (?????: 9.2 ?????)  ????: [phone]  ???: ??????????? ?????????  ??????: ??      - Dislocated Worker/Adult WIOA Employment Program  ????: [phone]  ???? ????????: [email or web address]@[employer] and [employer] ResearchL[facility].[employer]  ???? ????????: https://Faniummn.org/services/employment-services/dislocated-worker-program/  ???: ??????????? ?????????  ?????: ??? 8:00 ?????? - 4:30 ????? ??? 8:00 ?????? - 4:30 ????? ??? 8:00 ?????? - 4:30 ????? ??? 8:00 ?????? - 4:30 ????? ??? 8:00 ?????? - 4:30 ?????  ??????: ??  ??????? ??????: ???? ?????? ??? Ada               ????? ?????? ????        ????? ???????  911  .   ????? ?????  211 [email or web address]  .   ?????? ??????  [phone] http://[email or web address] [email or web address]  .     ???????? ???????? ????? ??????  988 http://988Carilion Clinicline.org  .   ???? ?????? ?????? ?????? ?????? ??????? ??????? ???????  [phone] http://ChildMercy Health Springfield Regional Medical Centerphotline.org   .   ???? ?????? ?????? ???????? ??????  [phone] (?????) http://[email or web address].org   .     ???? ????? ?????? ??????  [phone] (????)  http://[email or web address]  .   ??? ????? ???? ???????  ????/???? ????? ???? ??? ????? ?[phone] [place]: http://ppsupportmn.org ????????: http://psichaFeeding Forward.com/wi  .   ?? ???????? ?????? ?????? ???????? ([facility])  [phone] ([phone]) http://Find treatment.gov   .                ????? ?????????: ?? ????? ??? ??????? ??? ???? Unite Us. ?? ????? ???? Unite Us ??? ?? ?? ????? ??????? ????????? ?? ????? ??????? ???. ?? ???? ???? Unite Us ?? ??????? ???????? ?? ????? ??????? ??? ????? ????? ?? ?? ???? ????? ???? ?? ??????.    UT

## 2024-04-17 NOTE — PATIENT INSTRUCTIONS
Preventive Care Advice   This is general advice given by our system to help you stay healthy. However, your care team may have specific advice just for you. Please talk to your care team about your preventive care needs.  Nutrition  Eat 5 or more servings of fruits and vegetables each day.  Try wheat bread, brown rice and whole grain pasta (instead of white bread, rice, and pasta).  Get enough calcium and vitamin D. Check the label on foods and aim for 100% of the RDA (recommended daily allowance).  Lifestyle  Exercise at least 150 minutes each week   (30 minutes a day, 5 days a week).  Do muscle strengthening activities 2 days a week. These help control your weight and prevent disease.  No smoking.  Wear sunscreen to prevent skin cancer.  Have a dental exam and cleaning every 6 months.  Yearly exams  See your health care team every year to talk about:  Any changes in your health.  Any medicines your care team has prescribed.  Preventive care, family planning, and ways to prevent chronic diseases.  Shots (vaccines)   HPV shots (up to age 26), if you've never had them before.  Hepatitis B shots (up to age 59), if you've never had them before.  COVID-19 shot: Get this shot when it's due.  Flu shot: Get a flu shot every year.  Tetanus shot: Get a tetanus shot every 10 years.  Pneumococcal, hepatitis A, and RSV shots: Ask your care team if you need these based on your risk.  Shingles shot (for age 50 and up).  General health tests  Diabetes screening:  Starting at age 35, Get screened for diabetes at least every 3 years.  If you are younger than age 35, ask your care team if you should be screened for diabetes.  Cholesterol test: At age 39, start having a cholesterol test every 5 years, or more often if advised.  Bone density scan (DEXA): At age 50, ask your care team if you should have this scan for osteoporosis (brittle bones).  Hepatitis C: Get tested at least once in your life.  STIs (sexually transmitted  infections)  Before age 24: Ask your care team if you should be screened for STIs.  After age 24: Get screened for STIs if you're at risk. You are at risk for STIs (including HIV) if:  You are sexually active with more than one person.  You don't use condoms every time.  You or a partner was diagnosed with a sexually transmitted infection.  If you are at risk for HIV, ask about PrEP medicine to prevent HIV.  Get tested for HIV at least once in your life, whether you are at risk for HIV or not.  Cancer screening tests  Cervical cancer screening: If you have a cervix, begin getting regular cervical cancer screening tests at age 21. Most people who have regular screenings with normal results can stop after age 65. Talk about this with your provider.  Breast cancer scan (mammogram): If you've ever had breasts, begin having regular mammograms starting at age 40. This is a scan to check for breast cancer.  Colon cancer screening: It is important to start screening for colon cancer at age 45.  Have a colonoscopy test every 10 years (or more often if you're at risk) Or, ask your provider about stool tests like a FIT test every year or Cologuard test every 3 years.  To learn more about your testing options, visit: [email or web address].  For help making a decision, visit: https://bit.ly/ao00255.  Prostate cancer screening test: If you have a prostate and are age 55 to 69, ask your provider if you would benefit from a yearly prostate cancer screening test.  Lung cancer screening: If you are a current or former smoker age 50 to 80, ask your care team if ongoing lung cancer screenings are right for you.  For informational purposes only. Not to replace the advice of your health care provider. Copyright   2023 [place] [employer]. All rights reserved. Clinically reviewed by the [facility] Transitions Program. Neuralieve [id number] - REV 01/24.    Learning About Stress  What is stress?     Stress is your  body's response to a hard situation. Your body can have a physical, emotional, or mental response. Stress is a fact of life for most people, and it affects everyone differently. What causes stress for you may not be stressful for someone else.  A lot of things can cause stress. You may feel stress when you go on a job interview, take a test, or run a race. This kind of short-term stress is normal and even useful. It can help you if you need to work hard or react quickly. For example, stress can help you finish an important job on time.  Long-term stress is caused by ongoing stressful situations or events. Examples of long-term stress include long-term health problems, ongoing problems at work, or conflicts in your family. Long-term stress can harm your health.  How does stress affect your health?  When you are stressed, your body responds as though you are in danger. It makes hormones that speed up your heart, make you breathe faster, and give you a burst of energy. This is called the fight-or-flight stress response. If the stress is over quickly, your body goes back to normal and no harm is done.  But if stress happens too often or lasts too long, it can have bad effects. Long-term stress can make you more likely to get sick, and it can make symptoms of some diseases worse. If you tense up when you are stressed, you may develop neck, shoulder, or low back pain. Stress is linked to high blood pressure and heart disease.  Stress also harms your emotional health. It can make you cabrera, tense, or depressed. Your relationships may suffer, and you may not do well at work or school.  What can you do to manage stress?  You can try these things to help manage stress:   Do something active. Exercise or activity can help reduce stress. Walking is a great way to get started. Even everyday activities such as housecleaning or yard work can help.  Try yoga or oneal chi. These techniques combine exercise and meditation. You may need  some training at first to learn them.  Do something you enjoy. For example, listen to music or go to a movie. Practice your hobby or do volunteer work.  Meditate. This can help you relax, because you are not worrying about what happened before or what may happen in the future.  Do guided imagery. Imagine yourself in any setting that helps you feel calm. You can use online videos, books, or a teacher to guide you.  Do breathing exercises. For example:  From a standing position, bend forward from the waist with your knees slightly bent. Let your arms dangle close to the floor.  Breathe in slowly and deeply as you return to a standing position. Roll up slowly and lift your head last.  Hold your breath for just a few seconds in the standing position.  Breathe out slowly and bend forward from the waist.  Let your feelings out. Talk, laugh, cry, and express anger when you need to. Talking with supportive friends or family, a counselor, or a petra leader about your feelings is a healthy way to relieve stress. Avoid discussing your feelings with people who make you feel worse.  Write. It may help to write about things that are bothering you. This helps you find out how much stress you feel and what is causing it. When you know this, you can find better ways to cope.  What can you do to prevent stress?  You might try some of these things to help prevent stress:  Manage your time. This helps you find time to do the things you want and need to do.  Get enough sleep. Your body recovers from the stresses of the day while you are sleeping.  Get support. Your family, friends, and community can make a difference in how you experience stress.  Limit your news feed. Avoid or limit time on social media or news that may make you feel stressed.  Do something active. Exercise or activity can help reduce stress. Walking is a great way to get started.  Where can you learn more?  Go to https://www.healthwise.net/patiented  Enter N032 in the  "search box to learn more about \"Learning About Stress.\"  Current as of: October 24, 2023               Content Version: 14.0    1630-8882 [employer].   Care instructions adapted under license by your healthcare professional. If you have questions about a medical condition or this instruction, always ask your healthcare professional. [employer] disclaims any warranty or liability for your use of this information.      "

## 2024-04-17 NOTE — COMMUNITY RESOURCES LIST (ENGLISH)
April 17, 2024           YOUR PERSONALIZED LIST OF SERVICES & PROGRAMS           NAVIGATION    Eligibility Screening      CASA - [place] CASA Benefit Navigation - Citizen of [place]  [address] [place] [address] (Distance: 10.0 miles)  Phone: [phone]  Website: http://Chatterfly  Language: English, Citizen of [place]  Fee: Free  Accessibility: Translation services      Action Partnership (CAP) [facility] application assistance  [address] [facility] [address] [place] [address] (Distance: 9.2 miles)  Language: English, Citizen of [place]  Fee: Free      Sure - Navigators  Phone: (641) [phone]  Website: https://www.[facility].org/about-us/assister-program/navigators/index.jsp  Language: English  Hours: Mon 8:00 AM - 4:00 PM Tue 8:00 AM - 4:00 PM Wed 8:00 AM - 4:00 PM Thu 8:00 AM - 4:00 PM        ASSISTANCE    Nutrition Benefits      Action Partnership (CAP) [facility] application assistance  [address] [place] [address] (Distance: 11.7 miles)  Phone: [phone]) [phone]  Language: English, Citizen of [place]  Fee: Free      Action Partnership (CAP) [facility] application assistance  [address] [facility] [facility] [address] [place] [address] (Distance: 9.2 miles)  Language: English, Citizen of [place]  Fee: Free      [employer] [place] - SNAP (formerly food stamps) Screening and Application help  Phone: (523) 702-9549  Website: https://www.hungersolutions.org/programs/[place]-food-helpline/  Language: English  Hours: Mon 10:00 AM - 5:00 PM Tue 10:00 AM - 5:00 PM Wed 10:00 AM - 5:00 PM Thu 10:00 AM - 5:00 PM Fri 10:00 AM - 5:00 PM  Fee: Free  Accessibility: Ada accessible, Blind accommodation, Deaf or hard of hearing, Translation services    Pantry      [employer] [place] - Food Shelf - [facility]  [address] [place] [address] (Distance: 4.0 miles)  Phone: [phone]  Language: English  Fee: Free  Accessibility: Ada accessible      Action Partnership (CAP)   - Food Shelf  [address] [place] [address] [place] [address] (Distance: 9.2 miles)  Phone: [phone]  Language: English, [place]ian  Fee: Free      [employer]powered - [employer]  Phone: (843) 484-8852  Website: https://www.[employer].SceneShot/empowerment-food-bank  Language: English  Hours: Mon 9:00 AM - 5:00 PM Tue 9:00 AM - 5:00 PM Wed 9:00 AM - 5:00 PM Thu 9:00 AM - 5:00 PM Fri 9:00 AM - 5:00 PM  Fee: Free            Bill Payment Assistance      [facility]  [address] [place] [address] (Distance: 4.0 miles)  Phone: ([phone]) [phone]  Language: English  Fee: Free  Accessibility: Ada accessible      Action Partnership (CAP)  - Energy Assistance Program (EAP)  [address] [place] [address] [place] [address] (Distance: 9.2 miles)  Phone: (486) [phone]  Language: English, [place]ian  Fee: Free      - Dislocated Worker/Adult [employer] Employment Program  Phone: (779) 768-5175  Email: paarmjit@[employer]  Website: https://[employer]services/employment-services/dislocated-worker-program/  Language: English, Ukrainian  Hours: Mon 8:00 AM - 4:30 PM Tue 8:00 AM - 4:30 PM Wed 8:00 AM - 4:30 PM Thu 8:00 AM - 4:30 PM Fri 8:00 AM - 4:30 PM  Fee: Free  Accessibility: Ada accessible               IMPORTANT NUMBERS & WEBSITES        Emergency Services  911  .   United Way  211 http://211unitedway.org  .   Poison Control  (717) 603-1650 http://[place]poison.org http://wisconsinpoison.org  .     Suicide and Crisis Lifeline  988 http://988lifeline.org  .   Childhelp National Child Abuse Hotline  248.138.5687 http://Childhelphotline.org   .   National Sexual Assault Hotline  (632) 899-2427 (HOPE) http://GHH Commercen.org   .     National Runaway Safeline  (190) 374-5214 (RUNAWAY) http://[employer]SceneShot  .   Pregnancy & Postpartum Support  Call/text 069-449-9495  [place]: http://ppsupportmn.org  [place]: http://Shopzilla/wi  .    Substance Abuse National Helpline ([facility])  800-622-HELP (6567) http://Findtreatment.gov   .                DISCLAIMER: These resources have been generated via the [employer] Platform. [employer] does not endorse any service providers mentioned in this resource list. [employer] does not guarantee that the services mentioned in this resource list will be available to you or will improve your health or wellness.    [facility]

## 2024-04-17 NOTE — PROGRESS NOTES
"Preventive Care Visit  [facility]  Genesis Oneill MD, Family Medicine  Apr 17, 2024      Assessment & Plan     Routine general medical examination at a health care facility    - Lipid panel reflex to direct LDL Fasting  - Lipid panel reflex to direct LDL Fasting    Screen for colon cancer    - COLOGUARD(EXACT SCIENCES)    Gastroesophageal reflux disease without esophagitis  Stable  Continue   - famotidine (PEPCID) 20 MG tablet  Dispense: 30 tablet; Refill: 11    Acute bronchospasm  Refills per epicare    - albuterol (PROAIR HFA/PROVENTIL HFA/VENTOLIN HFA) 108 (90 Base) MCG/ACT inhaler  Dispense: 18 g; Refill: 2    Menopausal syndrome (hot flashes)  Will try low dose gabapentin  The potential side effects of the prescription medication were discussed with the patient.  Recheck in 6 months     - Follicle stimulating hormone  - gabapentin (NEURONTIN) 100 MG capsule  Dispense: 90 capsule; Refill: 4  - TSH with free T4 reflex  - Follicle stimulating hormone  - TSH with free T4 reflex      Patient has been advised of split billing requirements and indicates understanding: Yes    25 minutes spent by me on the date of the encounter doing chart review, history and exam, documentation and further activities per the note      BMI  Estimated body mass index is 28.32 kg/m  as calculated from the following:    Height as of this encounter: 1.626 m (5' 4\").    Weight as of this encounter: 74.8 kg (165 lb).   Weight management plan: Discussed healthy diet and exercise guidelines    Counseling  Appropriate preventive services were discussed with this patient, including applicable screening as appropriate for fall prevention, nutrition, physical activity, Tobacco-use cessation, weight loss and cognition.  Checklist reviewing preventive services available has been given to the patient.  Reviewed patient's diet, addressing concerns and/or questions.   She is at risk for lack of exercise and has been provided " with information to increase physical activity for the benefit of her well-being.   She is at risk for psychosocial distress and has been provided with information to reduce risk.       FUTURE APPOINTMENTS:       - Follow-up visit in recheck in 6 months   See Patient Instructions    Chaparrita [place] is a 49 year old, presenting for the following:  Physical  She is having lots of hot flashes and her periods are very light and occasionally they are skipped.   She does occasionally need to use inhaler for her lungs when getting sick.   She would like a refill.           4/17/2024     1:19 PM   Additional Questions   Roomed by Ricarda   Accompanied by Self        Health Care Directive  Patient does not have a Health Care Directive or Living Will: discussed last year     HPI  See above             4/17/2024   General Health   How would you rate your overall physical health? Good   Feel stress (tense, anxious, or unable to sleep) Only a little    Only a little   (!) STRESS CONCERN      4/17/2024   Nutrition   Three or more servings of calcium each day? Yes   Diet: Vegetarian/vegan   How many servings of fruit and vegetables per day? (!) 2-3   How many sweetened beverages each day? 0-1         4/17/2024   Exercise   Days per week of moderate/strenous exercise 2 days    3 days   Average minutes spent exercising at this level 20 min   (!) EXERCISE CONCERN      4/17/2024   Social Factors   Frequency of gathering with friends or relatives Twice a week    Once a week   Worry food won't last until get money to buy more Yes    No   Food not last or not have enough money for food? No    No   Do you have housing?  Yes    Yes   Are you worried about losing your housing? Patient declined    Yes   Lack of transportation? No    No   Unable to get utilities (heat,electricity)? Yes    Yes   Want help with housing or utility concern? No   (!) FOOD SECURITY CONCERN PRESENT(!) FINANCIAL RESOURCE STRAIN CONCERN      4/17/2024   Dental    Dentist two times every year? Yes         4/17/2024   TB Screening   Were you born outside of the [place]? Yes         Today's PHQ-2 Score:       4/17/2024     1:12 PM   PHQ-2 ( 1999 [employer]izer)   Q1: Little interest or pleasure in doing things 2   Q2: Feeling down, depressed or hopeless 0   PHQ-2 Score 2   Q1: Little interest or pleasure in doing things More than half the days   Q2: Feeling down, depressed or hopeless Not at all   PHQ-2 Score 2           4/17/2024   Substance Use   Frequency of drinking alcohol? Never   Alcohol more than 3/day or more than 7/wk No   Do you use any other substances recreationally? No     Social History     Tobacco Use    Smoking status: Never    Smokeless tobacco: Never   Vaping Use    Vaping status: Never Used   Substance Use Topics    Alcohol use: No    Drug use: No             4/17/2024   Breast Cancer Screening   Family history of breast, colon, or ovarian cancer? Yes         4/17/2024   LAST FHS-7 RESULTS   1st degree relative breast or ovarian cancer Yes   Any relative bilateral breast cancer No   Any male have breast cancer No   Any ONE woman have BOTH breast AND ovarian cancer No   Any woman with breast cancer before 50yrs No   2 or more relatives with breast AND/OR ovarian cancer No   2 or more relatives with breast AND/OR bowel cancer No        Mammogram Screening - Mammogram every 1-2 years updated in Health Maintenance based on mutual decision making        4/17/2024   STI Screening   New sexual partner(s) since last STI/HIV test? No     History of abnormal Pap smear: NO - age 30-65 PAP every 5 years with negative HPV co-testing recommended        Latest Ref Rng & Units 9/15/2021     1:12 PM 8/24/2016    12:00 AM 2/20/2013     5:21 PM   PAP / HPV   PAP  Negative for Intraepithelial Lesion or Malignancy (NILM)      PAP (Historical)   NIL  NIL    HPV 16 DNA Negative Negative      HPV 18 DNA Negative Negative      Other HR HPV Negative Negative        ASCVD Risk   The  10-year ASCVD risk score (Tamara DENNISON, et al., 2019) is: 1.1%    Values used to calculate the score:      Age: 49 years      Sex: Female      Is Non- : No      Diabetic: No      Tobacco smoker: No      Systolic Blood Pressure: 122 mmHg      Is BP treated: No      HDL Cholesterol: 47 mg/dL      Total Cholesterol: 195 mg/dL        4/17/2024   Contraception/Family Planning   Questions about contraception or family planning (!) DECLINE        Reviewed and updated as needed this visit by Provider                    Lab work is in process  Labs reviewed in EPIC  BP Readings from Last 3 Encounters:   04/17/24 122/85   07/17/23 139/89   06/15/23 (!) 159/74    Wt Readings from Last 3 Encounters:   04/17/24 74.8 kg (165 lb)   07/17/23 75.8 kg (167 lb 3.2 oz)   06/15/23 75.7 kg (166 lb 13.9 oz)                  Patient Active Problem List   Diagnosis    Esophageal reflux    Anemia    CARDIOVASCULAR SCREENING; LDL GOAL LESS THAN 160    Plantar fasciitis    IBS (irritable bowel syndrome)    Seasonal allergic rhinitis    Back pain    Family history of osteoporosis     No past surgical history on file.    Social History     Tobacco Use    Smoking status: Never    Smokeless tobacco: Never   Substance Use Topics    Alcohol use: No     Family History   Problem Relation Age of Onset    Cancer Mother         cervical    Thyroid Disease Mother         took medication for this    Family History Negative Father     Osteoporosis Paternal Uncle         60         Current Outpatient Medications   Medication Sig Dispense Refill    albuterol (PROAIR HFA/PROVENTIL HFA/VENTOLIN HFA) 108 (90 Base) MCG/ACT inhaler Inhale 2 puffs into the lungs every 4 hours as needed for shortness of breath or wheezing 18 g 2    famotidine (PEPCID) 20 MG tablet Take 1 tablet (20 mg) by mouth 2 times daily as needed (abdominal pain) 30 tablet 11    gabapentin (NEURONTIN) 100 MG capsule Take 1 capsule (100 mg) by mouth at bedtime 90 capsule 4  "   Calcium Carbonate (TUMS 500 OR)       DiphenhydrAMINE HCl (BENADRYL PO)       ibuprofen (ADVIL/MOTRIN) 200 MG tablet Take 1 tablet (200 mg) by mouth every 8 hours as needed for mild pain      vitamin D3 (CHOLECALCIFEROL) 50 mcg (2000 units) tablet Take 1 tablet (50 mcg) by mouth daily 100 tablet 4         Review of Systems  Constitutional, HEENT, cardiovascular, pulmonary, gi and gu systems are negative, except as otherwise noted.     Objective    Exam  /85 (BP Location: Right arm, Patient Position: Sitting, Cuff Size: Adult Large)   Pulse 72   Temp 98.1  F (36.7  C) (Oral)   Resp 16   Ht 1.626 m (5' 4\")   Wt 74.8 kg (165 lb)   LMP 04/04/2024   SpO2 100%   BMI 28.32 kg/m     Estimated body mass index is 28.32 kg/m  as calculated from the following:    Height as of this encounter: 1.626 m (5' 4\").    Weight as of this encounter: 74.8 kg (165 lb).    Physical Exam  GENERAL: alert and no distress  EYES: Eyes grossly normal to inspection, PERRL and conjunctivae and sclerae normal  HENT: ear canals and TM's normal, nose and mouth without ulcers or lesions  NECK: no adenopathy, no asymmetry, masses, or scars  RESP: lungs clear to auscultation - no rales, rhonchi or wheezes  BREAST: normal without masses, tenderness or nipple discharge and no palpable axillary masses or adenopathy  CV: regular rate and rhythm, normal S1 S2, no S3 or S4, no murmur, click or rub, no peripheral edema  ABDOMEN: soft, nontender, no hepatosplenomegaly, no masses and bowel sounds normal  MS: no gross musculoskeletal defects noted, no edema  SKIN: no suspicious lesions or rashes  NEURO: Normal strength and tone, mentation intact and speech normal  PSYCH: mentation appears normal, affect normal/bright  LYMPH: no cervical, supraclavicular, axillary, or inguinal adenopathy        Signed Electronically by: Genesis Oneill MD    "

## 2024-04-17 NOTE — COMMUNITY RESOURCES LIST (ENGLISH)
April 17, 2024           YOUR PERSONALIZED LIST OF SERVICES & PROGRAMS           & SHELTER    Case Management      [employer] - Community [facility] Worker [facility]  [address] [place] [address] (Distance: 16.1 miles)  Phone: [phone]  Email: [email or web address]  Website: [email or web address]  Language: English, Bhutanese, Czech  Fee: Free, [facility]  [address] [place] [address] (Distance: 17.5 miles)  Phone: [phone]  Language: English  Fee: Self pay  Accessibility: Ada accessible      [employer] - Housing Stabilization Services  Phone: [phone] [phone]  Website: https://homebasemn.com/  Language: English  Hours: Mon 8:00 AM - 4:00 PM Tue 8:00 AM - 4:00 PM Wed 8:00 AM - 4:00 PM Thu 8:00 AM - 4:00 PM Fri 8:00 AM - 4:00 PM  Fee: Free  Accessibility: Blind accommodation, Deaf or hard of hearing  Transportation Options: Free transportation    Payment Assistance      [facility] ([facility]) - Homebuyer Counseling - Rent and Mortgage Payment Assistance  [address] [place] [address] (Distance: 9.0 miles)  Language: English  Fee: Free  Accessibility: Translation services, Ada accessible, Blind accommodation      [employer] (CAP) [facility] - Family Homeless Prevention Assistance Program (FHPAP)  [address] [place] [address] (Distance: 9.2 miles)  Language: English, Armenian  Fee: Free      [employer] - Keep the Key  Phone: (687) 311-9304  Website: https://www.hhx71-swgqypqjfifwqc.org/programs.html  Language: English  Hours: Mon 7:00 AM - 7:00 PM Tue 7:00 AM - 7:00 PM Wed 7:00 AM - 7:00 PM Thu 7:00 AM - 7:00 PM Fri 7:00 AM - 7:00 PM  Fee: Free    Mediation & Eviction Prevention      [facility] [place] [facility] ([facility]) - Foreclosure Prevention Counseling  [address] [place] [address]  (Distance: 9.0 miles)  Phone: [phone]  Language: English  Fee: Free  Accessibility: Translation services, Ada accessible, Blind accommodation      [facility] [employer] - Mortgage Foreclosure Prevention  [address] [place] [address] (Distance: 17.5 miles)  Phone: [phone]  Language: English  Fee: Free  Accessibility: Ada accessible      Line - Tenant Rights / Eviction Prevention  Website: https://[facility].org/c-npka-hm-/  Language: English, Indian            Bill Payment Assistance      [facility] - [facility]  [address] [place] [address] (Distance: 4.0 miles)  Phone: [phone]  Language: English  Fee: Free  Accessibility: Ada accessible      Action Partnership (CAP) [facility] - Energy Assistance Program (EAP)  [address] [place] [address] (Distance: 9.2 miles)  Phone: (192) [phone]  Language: English, Indian  Fee: Free      - Dislocated Worker/Adult [employer] Employment Program  Phone: (264) 159-7225  Email: paramjit@[employer]  Website: https://[employer]/services/employment-services/dislocated-worker-program/  Language: English, Latanya  Hours: Mon 8:00 AM - 4:30 PM Tue 8:00 AM - 4:30 PM Wed 8:00 AM - 4:30 PM Thu 8:00 AM - 4:30 PM Fri 8:00 AM - 4:30 PM  Fee: Free  Accessibility: Ada accessible               IMPORTANT NUMBERS & WEBSITES        Emergency Services  911  .   [employer]  211 http://211unitedway.org  .   Poison Control  (758) 318-9584 http://[place]poison.org http://w[place]poison.org  .     Suicide and Crisis Lifeline  988 http://988lifeline.org  .   Childhelp National Child Abuse Hotline  244.433.2186 http://Childhelphotline.org   .   National Sexual Assault Hotline  (395) 712-7049 (HOPE) http://Rainn.org   .     National Runaway Safeline  (512) 317-3014 (RUNAWAY) http://[address] [place] [address].org  .   Pregnancy & Postpartum Support  Call/text 376-394-7136  [place]:  http://ppsupportmn.org  [place]: http://Dialectica.com/wi  .   Substance Abuse National Helpline ([facility])  800-922-HELP (7540) http://Findtreatment.gov   .                DISCLAIMER: These resources have been generated via the "[employer] Platform. "[employer] does not endorse any service providers mentioned in this resource list. "[employer] does not guarantee that the services mentioned in this resource list will be available to you or will improve your health or wellness.    [facility]

## 2024-04-19 LAB
CHOLEST SERPL-MCNC: 177 MG/DL
FASTING STATUS PATIENT QL REPORTED: NO
FSH SERPL IRP2-ACNC: 41.9 MIU/ML
HDLC SERPL-MCNC: 49 MG/DL
LDLC SERPL CALC-MCNC: 111 MG/DL
NONHDLC SERPL-MCNC: 128 MG/DL
TRIGL SERPL-MCNC: 86 MG/DL
TSH SERPL DL<=0.005 MIU/L-ACNC: 0.55 UIU/ML (ref 0.3–4.2)

## 2025-04-14 ENCOUNTER — PATIENT OUTREACH (OUTPATIENT)
Dept: CARE COORDINATION | Facility: CLINIC | Age: 50
End: 2025-04-14
Payer: COMMERCIAL

## 2025-04-28 ENCOUNTER — PATIENT OUTREACH (OUTPATIENT)
Dept: CARE COORDINATION | Facility: CLINIC | Age: 50
End: 2025-04-28
Payer: COMMERCIAL

## 2025-05-31 ENCOUNTER — HEALTH MAINTENANCE LETTER (OUTPATIENT)
Age: 50
End: 2025-05-31

## 2025-07-21 ENCOUNTER — PATIENT OUTREACH (OUTPATIENT)
Dept: CARE COORDINATION | Facility: CLINIC | Age: 50
End: 2025-07-21
Payer: COMMERCIAL